# Patient Record
Sex: FEMALE | Race: WHITE | NOT HISPANIC OR LATINO | Employment: OTHER | URBAN - METROPOLITAN AREA
[De-identification: names, ages, dates, MRNs, and addresses within clinical notes are randomized per-mention and may not be internally consistent; named-entity substitution may affect disease eponyms.]

---

## 2017-09-18 ENCOUNTER — HISTORICAL (OUTPATIENT)
Dept: ADMINISTRATIVE | Facility: HOSPITAL | Age: 82
End: 2017-09-18

## 2019-07-09 ENCOUNTER — HISTORICAL (OUTPATIENT)
Dept: ADMINISTRATIVE | Facility: HOSPITAL | Age: 84
End: 2019-07-09

## 2019-07-09 LAB
ABS NEUT (OLG): 2.86 X10(3)/MCL (ref 2.1–9.2)
ALBUMIN SERPL-MCNC: 3.6 GM/DL (ref 3.4–5)
ALBUMIN/GLOB SERPL: 1 RATIO (ref 1.1–2)
ALP SERPL-CCNC: 63 UNIT/L (ref 45–117)
ALT SERPL-CCNC: 16 UNIT/L (ref 12–78)
AST SERPL-CCNC: 14 UNIT/L (ref 15–37)
BASOPHILS # BLD AUTO: 0.05 X10(3)/MCL
BASOPHILS NFR BLD AUTO: 1 %
BILIRUB SERPL-MCNC: 0.6 MG/DL (ref 0.2–1)
BILIRUBIN DIRECT+TOT PNL SERPL-MCNC: 0.1 MG/DL
BILIRUBIN DIRECT+TOT PNL SERPL-MCNC: 0.5 MG/DL
BUN SERPL-MCNC: 10 MG/DL (ref 7–18)
CALCIUM SERPL-MCNC: 9 MG/DL (ref 8.5–10.1)
CHLORIDE SERPL-SCNC: 107 MMOL/L (ref 98–107)
CHOLEST SERPL-MCNC: 247 MG/DL
CHOLEST/HDLC SERPL: 3.4 {RATIO} (ref 0–4.4)
CO2 SERPL-SCNC: 26 MMOL/L (ref 21–32)
CREAT SERPL-MCNC: 0.8 MG/DL (ref 0.6–1.3)
EOSINOPHIL # BLD AUTO: 0.17 10*3/UL
EOSINOPHIL NFR BLD AUTO: 3 %
ERYTHROCYTE [DISTWIDTH] IN BLOOD BY AUTOMATED COUNT: 13.9 % (ref 11.5–14.5)
EST. AVERAGE GLUCOSE BLD GHB EST-MCNC: 117 MG/DL
GLOBULIN SER-MCNC: 3.5 GM/ML (ref 2.3–3.5)
GLUCOSE SERPL-MCNC: 100 MG/DL (ref 74–106)
HBA1C MFR BLD: 5.7 % (ref 4.2–6.3)
HCT VFR BLD AUTO: 46 % (ref 35–46)
HDLC SERPL-MCNC: 72 MG/DL
HGB BLD-MCNC: 14.8 GM/DL (ref 12–16)
IMM GRANULOCYTES # BLD AUTO: 0.02 10*3/UL
IMM GRANULOCYTES NFR BLD AUTO: 0 %
LDLC SERPL CALC-MCNC: 152 MG/DL (ref 0–130)
LYMPHOCYTES # BLD AUTO: 2 X10(3)/MCL
LYMPHOCYTES NFR BLD AUTO: 36 % (ref 13–40)
MCH RBC QN AUTO: 29.9 PG (ref 26–34)
MCHC RBC AUTO-ENTMCNC: 32.2 GM/DL (ref 31–37)
MCV RBC AUTO: 92.9 FL (ref 80–100)
MONOCYTES # BLD AUTO: 0.52 X10(3)/MCL
MONOCYTES NFR BLD AUTO: 9 % (ref 4–12)
NEUTROPHILS # BLD AUTO: 2.86 X10(3)/MCL
NEUTROPHILS NFR BLD AUTO: 51 X10(3)/MCL
PLATELET # BLD AUTO: 239 X10(3)/MCL (ref 130–400)
PMV BLD AUTO: 10.5 FL (ref 7.4–10.4)
POTASSIUM SERPL-SCNC: 4 MMOL/L (ref 3.5–5.1)
PROT SERPL-MCNC: 7.1 GM/DL (ref 6.4–8.2)
RBC # BLD AUTO: 4.95 X10(6)/MCL (ref 4–5.2)
SODIUM SERPL-SCNC: 140 MMOL/L (ref 136–145)
TRIGL SERPL-MCNC: 117 MG/DL
TSH SERPL-ACNC: 2.19 MIU/L (ref 0.36–3.74)
VLDLC SERPL CALC-MCNC: 23 MG/DL
WBC # SPEC AUTO: 5.6 X10(3)/MCL (ref 4.5–11)

## 2020-09-21 ENCOUNTER — HISTORICAL (OUTPATIENT)
Dept: ADMINISTRATIVE | Facility: HOSPITAL | Age: 85
End: 2020-09-21

## 2020-09-21 LAB
ABS NEUT (OLG): 3.77 X10(3)/MCL (ref 2.1–9.2)
ALBUMIN SERPL-MCNC: 3.6 GM/DL (ref 3.4–5)
ALBUMIN/GLOB SERPL: 0.9 RATIO (ref 1.1–2)
ALP SERPL-CCNC: 75 UNIT/L (ref 45–117)
ALT SERPL-CCNC: 18 UNIT/L (ref 12–78)
AST SERPL-CCNC: 13 UNIT/L (ref 15–37)
BASOPHILS # BLD AUTO: 0.1 X10(3)/MCL (ref 0–0.2)
BASOPHILS NFR BLD AUTO: 1 %
BILIRUB SERPL-MCNC: 0.4 MG/DL (ref 0.2–1)
BILIRUBIN DIRECT+TOT PNL SERPL-MCNC: 0.1 MG/DL (ref 0–0.2)
BILIRUBIN DIRECT+TOT PNL SERPL-MCNC: 0.3 MG/DL
BUN SERPL-MCNC: 14 MG/DL (ref 7–18)
CALCIUM SERPL-MCNC: 9 MG/DL (ref 8.5–10.1)
CHLORIDE SERPL-SCNC: 102 MMOL/L (ref 98–107)
CHOLEST SERPL-MCNC: 255 MG/DL
CHOLEST/HDLC SERPL: 3.8 {RATIO} (ref 0–4.4)
CO2 SERPL-SCNC: 31 MMOL/L (ref 21–32)
CREAT SERPL-MCNC: 0.9 MG/DL (ref 0.6–1.3)
EOSINOPHIL # BLD AUTO: 0.2 X10(3)/MCL (ref 0–0.9)
EOSINOPHIL NFR BLD AUTO: 3 %
ERYTHROCYTE [DISTWIDTH] IN BLOOD BY AUTOMATED COUNT: 13.8 % (ref 11.5–14.5)
EST. AVERAGE GLUCOSE BLD GHB EST-MCNC: 128 MG/DL
GLOBULIN SER-MCNC: 4 GM/ML (ref 2.3–3.5)
GLUCOSE SERPL-MCNC: 94 MG/DL (ref 82–115)
HBA1C MFR BLD: 6.1 % (ref 4.2–6.3)
HCT VFR BLD AUTO: 45.2 % (ref 35–46)
HDLC SERPL-MCNC: 67 MG/DL (ref 40–59)
HGB BLD-MCNC: 14.6 GM/DL (ref 12–16)
IMM GRANULOCYTES # BLD AUTO: 0.01 10*3/UL
IMM GRANULOCYTES NFR BLD AUTO: 0 %
LDLC SERPL CALC-MCNC: 146 MG/DL
LYMPHOCYTES # BLD AUTO: 2 X10(3)/MCL (ref 0.6–4.6)
LYMPHOCYTES NFR BLD AUTO: 31 %
MCH RBC QN AUTO: 30.7 PG (ref 26–34)
MCHC RBC AUTO-ENTMCNC: 32.3 GM/DL (ref 31–37)
MCV RBC AUTO: 95 FL (ref 80–100)
MONOCYTES # BLD AUTO: 0.6 X10(3)/MCL (ref 0.1–1.3)
MONOCYTES NFR BLD AUTO: 9 %
NEUTROPHILS # BLD AUTO: 3.77 X10(3)/MCL (ref 2.1–9.2)
NEUTROPHILS NFR BLD AUTO: 56 %
PLATELET # BLD AUTO: 252 X10(3)/MCL (ref 130–400)
PMV BLD AUTO: 10.4 FL (ref 7.4–10.4)
POTASSIUM SERPL-SCNC: 4.2 MMOL/L (ref 3.5–5.1)
PROT SERPL-MCNC: 7.6 GM/DL (ref 6.4–8.2)
RBC # BLD AUTO: 4.76 X10(6)/MCL (ref 4–5.2)
SODIUM SERPL-SCNC: 139 MMOL/L (ref 136–145)
T4 FREE SERPL-MCNC: 1.06 NG/DL (ref 0.76–1.46)
TRIGL SERPL-MCNC: 211 MG/DL
TSH SERPL-ACNC: 2.34 MIU/L (ref 0.36–3.74)
VLDLC SERPL CALC-MCNC: 42 MG/DL
WBC # SPEC AUTO: 6.7 X10(3)/MCL (ref 4.5–11)

## 2021-07-01 ENCOUNTER — HISTORICAL (OUTPATIENT)
Dept: ADMINISTRATIVE | Facility: HOSPITAL | Age: 86
End: 2021-07-01

## 2021-07-01 LAB
ALBUMIN SERPL-MCNC: 3.8 GM/DL (ref 3.4–4.8)
ALBUMIN/GLOB SERPL: 1 RATIO (ref 1.1–2)
ALP SERPL-CCNC: 74 UNIT/L (ref 40–150)
ALT SERPL-CCNC: 9 UNIT/L (ref 0–55)
AST SERPL-CCNC: 15 UNIT/L (ref 5–34)
BILIRUB SERPL-MCNC: 0.7 MG/DL
BILIRUBIN DIRECT+TOT PNL SERPL-MCNC: 0.2 MG/DL (ref 0–0.5)
BILIRUBIN DIRECT+TOT PNL SERPL-MCNC: 0.5 MG/DL (ref 0–0.8)
BUN SERPL-MCNC: 16.2 MG/DL (ref 9.8–20.1)
CALCIUM SERPL-MCNC: 10 MG/DL (ref 8.4–10.2)
CHLORIDE SERPL-SCNC: 102 MMOL/L (ref 98–107)
CO2 SERPL-SCNC: 31 MMOL/L (ref 23–31)
CREAT SERPL-MCNC: 0.84 MG/DL (ref 0.55–1.02)
EST CREAT CLEARANCE SER (OHS): 57.53 ML/MIN
GLOBULIN SER-MCNC: 3.8 GM/DL (ref 2.4–3.5)
GLUCOSE SERPL-MCNC: 109 MG/DL (ref 82–115)
POTASSIUM SERPL-SCNC: 4.2 MMOL/L (ref 3.5–5.1)
PROT SERPL-MCNC: 7.6 GM/DL (ref 5.8–7.6)
SODIUM SERPL-SCNC: 142 MMOL/L (ref 136–145)

## 2022-04-07 ENCOUNTER — HISTORICAL (OUTPATIENT)
Dept: ADMINISTRATIVE | Facility: HOSPITAL | Age: 87
End: 2022-04-07
Payer: MEDICARE

## 2022-04-23 VITALS
DIASTOLIC BLOOD PRESSURE: 76 MMHG | BODY MASS INDEX: 32.05 KG/M2 | WEIGHT: 169.75 LBS | OXYGEN SATURATION: 95 % | HEIGHT: 61 IN | SYSTOLIC BLOOD PRESSURE: 148 MMHG

## 2022-05-01 NOTE — HISTORICAL [facility] CERNER
This is a historical note converted from Prieto. Formatting and pictures may have been removed.  Please reference Prieto for original formatting and attached multimedia. Chief Complaint  check up unsure if she takes vesicare  History of Present Illness  84 yo wf with obesity macular degeneration as sees cards htn chol arthritis  Review of Systems  neg cv, neg pulm, neg gi gu ros as documentd in hpi  Physical Exam  Vitals & Measurements  T:?36.7? ?C (Oral)? HR:?74(Peripheral)? RR:?18? BP:?130/74?  HT:?154.00?cm? WT:?77.400?kg? BMI:?32.64?  aax4 nad  niecy eomi  cv rrr s1s2 no mgr  lungs cta no cr or whz  abd nt soft  ext no edema  neuro intact  Assessment/Plan  1.?HLD (hyperlipidemia)?E78.5  ?flp?? lab today  ?  2.?HTN (hypertension)?I10  ?controlled  Ordered:  CBC w/ Auto Diff, Routine collect, *Est. 09/21/20 3:00:00 CDT, Blood, Order for future visit, *Est. Stop date 09/21/20 3:00:00 CDT, Lab Collect, HTN (hypertension), 09/21/20 11:51:00 CDT  Clinic Follow up, *Est. 03/21/21 3:00:00 CDT, Order for future visit, HTN (hypertension), [facility]  Comprehensive Metabolic Panel, Routine collect, *Est. 09/21/20 3:00:00 CDT, Blood, Order for future visit, *Est. Stop date 09/21/20 3:00:00 CDT, Lab Collect, HTN (hypertension), 09/21/20 11:51:00 CDT  Free T4, Routine collect, *Est. 09/21/20 3:00:00 CDT, Blood, Order for future visit, *Est. Stop date 09/21/20 3:00:00 CDT, Lab Collect, HTN (hypertension), 09/21/20 11:51:00 CDT  Hemoglobin A1C [facility], Routine collect, *Est. 09/21/20 3:00:00 CDT, Blood, Order for future visit, *Est. Stop date 09/21/20 3:00:00 CDT, Lab Collect, HTN (hypertension), 09/21/20 11:52:00 CDT  Lipid Panel, Routine collect, *Est. 09/21/20 3:00:00 CDT, Blood, Order for future visit, *Est. Stop date 09/21/20 3:00:00 CDT, Lab Collect, HTN (hypertension), 09/21/20 11:51:00 CDT  Office/Outpatient Visit Level 4 Established [id number] PC, HTN (hypertension), [facility], 09/21/20 11:51:00 CDT  Thyroid Stimulating  Hormone, Routine collect, *Est. 09/21/20 3:00:00 CDT, Blood, Order for future visit, *Est. Stop date 09/21/20 3:00:00 CDT, Lab Collect, HTN (hypertension), 09/21/20 11:51:00 CDT  ?  3.?Obesity?E66.9  ?dash  ?  4.?SOB (shortness of breath)?R06.02  ?nuc stress test  Ordered:  Nuclear Pharmacological Stress Test, *Est. 09/28/20 3:00:00 CDT, Routine, Angina Pectoris (Chest Pain), *Est. Stop date 09/28/20 3:00:00 CDT, Ambulatory, [facility], Order for future visit, SOB (shortness of breath)  ?  Referrals  Clinic Follow up, *Est. 03/21/21 3:00:00 CDT, Order for future visit, HTN (hypertension), [facility]   Problem List/Past Medical History  Ongoing  AMD (age-related macular degeneration), bilateral  Dermal nevus  HLD (hyperlipidemia)  HTN (hypertension)  Obesity  Osteoporosis  Pseudophakia of both eyes  Historical  No qualifying data  Procedure/Surgical History  bladder surgery (2005)  Abdominoplasty (1974)  Breast reduction, bilateral (1974)  abdominal surgery (1958)  Appendectomy (1943)  Appendectomy  Augmentation of bladder  Breast reduction  Laparoscopy  Oophorectomy  Partial oophorectomy  Phacoemulsification of lens and insertion of intraocular lens   Medications  Boniva 150 mg oral tablet, 150 mg= 1 tab(s), Oral, qMonth, 3 refills  FLUZONE HD INJ PF 18-19  hydrochlorothiazide-lisinopril 12.5 mg-10 mg oral tablet, 1 tab(s), Oral, Daily, 1 refills  olopatadine 0.1% ophthalmic solution, 1 drop(s), Eye-Both, BID, 5 refills  oxybutynin 5 mg oral tablet, 5 mg= 1 tab(s), Oral, TID, 5 refills  simvastatin 40 mg oral tablet, 40 mg= 1 tab(s), Oral, Once a day (at bedtime), 1 refills  tolterodine 4 mg oral capsule, extended release, 4 mg= 1 cap(s), Oral, Daily, 3 refills  VESIcare 10 mg oral tablet, 10 mg= 1 tab(s), Oral, Daily, 11 refills  Vitamin D with Minerals oral tablet  Allergies  codeine  Social History  Abuse/Neglect  No, No, Yes, 09/21/2020  Alcohol  Current, Wine, 1-2 times per month,  Started age 18 Years. Alcohol use interferes with work or home: No. Drinks more than intended: No. Others hurt by drinking: No. Ready to change: No. Household alcohol concerns: No., 05/18/2015  Employment/School  Employed, Highest education level: Some college., 05/18/2015  Exercise - Does not exercise, 05/18/2015  Self assessment: Excellent condition., 08/31/2015  Home/Environment  Lives with Alone. Living situation: Home/Independent. Alcohol abuse in household: No. Substance abuse in household: No. Smoker in household: No. Injuries/Abuse/Neglect in household: No. Feels unsafe at home: No. Safe place to go: Yes. Family/Friends available for support: Yes. Concern for family members at home: No. Major illness in household: No. Financial concerns: No. TV/Computer concerns: No. Risks in environment: Unlocked guns, Pets/Animal exposure., 08/31/2015  Nutrition/Health  Regular, Caffeine intake amount: 1 cup of coffee a week. Wants to lose weight: Yes. Sleeping concerns: No. Feels highly stressed: No., 05/18/2015  Other  Sexual  Gender Identity Identifies as female., 08/21/2020  Substance Use  Never, 12/14/2015  Tobacco  Never (less than 100 in lifetime), N/A, 09/21/2020  Immunizations  Vaccine Date Status Comments   influenza virus vaccine, inactivated 11/25/2019 Recorded    influenza virus vaccine, inactivated 11/16/2018 Recorded    pneumococcal vacc 06/08/2018 Recorded [6/8/2018] Immunization record scanned in   pneumococcal 13-valent conjugate vaccine 06/06/2018 Recorded    influenza virus vaccine, inactivated 12/04/2017 Given Patient tolerated procedure well.   influenza virus vaccine, inactivated 10/27/2016 Given    influenza virus vaccine, inactivated 12/14/2015 Given    pneumococcal vacc 10/23/2012 Recorded    influenza virus vaccine, inactivated 09/20/2011 Recorded    influenza virus vaccine, inactivated 10/20/2009 Recorded    influenza virus vaccine, inactivated 03/07/2005 Recorded    influenza virus vaccine,  inactivated 12/12/2003 Recorded    influenza virus vaccine, inactivated 03/12/2003 Recorded    influenza virus vaccine, inactivated 12/11/2001 Recorded    Health Maintenance  Health Maintenance  ???Pending?(in the next year)  ??? ??OverDue  ??? ? ? ?Influenza Vaccine due??and every?  ??? ? ? ?Hypertension Management-BMP due??07/08/20??and every 1??year(s)  ??? ??Due?  ??? ? ? ?Hypertension Maintenance-Medication Prescribed due??09/21/20??and every 1??year(s)  ??? ? ? ?Hypertension Management-Education due??09/21/20??and every 1??year(s)  ??? ? ? ?Medicare Annual Wellness Exam due??09/21/20??and every 1??year(s)  ??? ? ? ?Pneumococcal Vaccine due??09/21/20??and every?  ??? ? ? ?Tetanus Vaccine due??09/21/20??and every 10??year(s)  ??? ? ? ?Zoster Vaccine due??09/21/20??and every?  ??? ??Due In Future?  ??? ? ? ?Obesity Screening not due until??01/01/21??and every 1??year(s)  ??? ? ? ?Advance Directive not due until??01/02/21??and every 1??year(s)  ??? ? ? ?Cognitive Screening not due until??01/02/21??and every 1??year(s)  ??? ? ? ?Fall Risk Assessment not due until??01/02/21??and every 1??year(s)  ??? ? ? ?Functional Assessment not due until??01/02/21??and every 1??year(s)  ???Satisfied?(in the past 1 year)  ??? ??Satisfied?  ??? ? ? ?ADL Screening on??09/21/20.??Satisfied by Ananda Vu LPN Aerial  ??? ? ? ?Advance Directive on??09/21/20.??Satisfied by Ananda Vu LPN Aerial  ??? ? ? ?Blood Pressure Screening on??09/21/20.??Satisfied by Vu LPN, Ananda Aerial  ??? ? ? ?Body Mass Index Check on??09/21/20.??Satisfied by Vu LPN, Ananda Aerial  ??? ? ? ?Cognitive Screening on??09/21/20.??Satisfied by Vu LPN, Ananda Aerial  ??? ? ? ?Depression Screening on??09/21/20.??Satisfied by Vu LPN, Ananda Aerial  ??? ? ? ?Fall Risk Assessment on??09/21/20.??Satisfied by Vu LPN, Ananda Aerial  ??? ? ? ?Functional Assessment on??09/21/20.??Satisfied by Vu LPN, Ananda Aerial  ??? ? ? ?Hypertension  Management-Blood Pressure on??09/21/20.??Satisfied by Ananda Vu LPN  ??? ? ? ?Influenza Vaccine on??11/25/19.??Satisfied by Jeanie Cho LPN  ??? ? ? ?Obesity Screening on??09/21/20.??Satisfied by Ananda Vu LPN  ?

## 2022-05-02 NOTE — HISTORICAL [facility] CERNER
This is a historical note converted from Cerner. Formatting and pictures may have been removed.  Please reference Prieto for original formatting and attached multimedia. Chief Complaint  New pt, CKD, swelling left leg majority of time-had to take 2 fluid pills  History of Present Illness  This is a very pleasant 86-year-old  female with past medical history of hypertension,?age-related macular degeneration, and osteoporosis. ?Patient presents to establish care in nephrology clinic today.? Complains of?lower extremity edema, states that she?was taking?2 pills of hydrochlorothiazide-lisinopril instead of 1. ?Denies nausea, vomiting, shortness of breath, chest pain.  Review of Systems  12 point review of systems conducted, negative except as stated in the history of present illness.  Physical Exam  Vitals & Measurements  T:?36.8? ?C (Oral)? HR:?95(Peripheral)? RR:?18? BP:?122/76?  HT:?155.50?cm? WT:?75.800?kg? BMI:?31.35?  General appearance: Appears in no acute distress.?  Skin: No rashes, capillary refill <3 sec. Good turgor.?  HEENT: NC/AT. PERRLA, EOMI, no scleral icterus. No thyromegaly, JVD, neck supple.?  Chest: Equal expansion, clear to auscultation bilaterally, respirations unlabored.?  Heart: S1-S2  Abdomen: Benign.?  Genitourinary: Deferred  Extremities: No edema, no cyanosis or clubbing, pulses equal and palpable throughout.?  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 ?Will hold?lisinopril-hydrochlorothiazide, check CMP today, follow-up with results by phone.  Continue renal sparing activities:  ?   -Follow low sodium diet (2 grams a day)  -Control high blood pressure (?goal BP < 130/80, please record BP at home every day and bring log to next office visit)  -Exercise at least 30 minutes a day, 5 days a week.  -Maintain healthy weight.  -Stay well hydrated  -Receive Pneumovax, Flu, and HBV vaccines if  indicated.  -Do not take NSAIDs (Ibuprofen, Naproxen, Aleve, Advil, Toradol, Mobic), may take only Tylenol as needed for pain/headaches.  -Take cholesterol-lowering medications as prescribed (LDL goal <100)  ?   Handout on treatment options for kidney disease provided.  F/U with PCP as scheduled  [facility] with routine labs in?4 months  Ordered:  1160F- Medication reconciliation completed during visit, CKD (chronic kidney disease), stage III  HTN (hypertension)  BMI 32.0-32.9,adult, [facility], 07/01/21 12:09:00 CDT  1160F- Medication reconciliation completed during visit, CKD (chronic kidney disease), stage III  HTN (hypertension)  BMI 32.0-32.9,adult, [facility], 07/01/21 12:07:00 CDT  Office/Outpatient Visit Level 4 Established [id number] PC, CKD (chronic kidney disease), stage III  HTN (hypertension)  BMI 32.0-32.9,adult, [facility], 07/01/21 12:07:00 CDT  Office/Outpatient Visit Level 4 New [id number] PC, CKD (chronic kidney disease), stage III  HTN (hypertension)  BMI 32.0-32.9,adult, [facility], 07/01/21 12:09:00 CDT  ?  2.?HTN (hypertension)?I10  Bp is at goal. Will hold HCTZ-lisinopril and continue to monitor BP. Will start torsemide for edema.  Ordered:  1160F- Medication reconciliation completed during visit, CKD (chronic kidney disease), stage III  HTN (hypertension)  BMI 32.0-32.9,adult, [facility], 07/01/21 12:09:00 CDT  1160F- Medication reconciliation completed during visit, CKD (chronic kidney disease), stage III  HTN (hypertension)  BMI 32.0-32.9,adult, [facility], 07/01/21 12:07:00 CDT  Office/Outpatient Visit Level 4 Established [id number] PC, CKD (chronic kidney disease), stage III  HTN (hypertension)  BMI 32.0-32.9,adult, [facility], 07/01/21 12:07:00 CDT  Office/Outpatient Visit Level 4 New [id number] PC, CKD (chronic kidney disease), stage III  HTN (hypertension)  BMI 32.0-32.9,adult, [facility], 07/01/21 12:09:00 CDT  ?  3.?BMI  32.0-32.9,adult?Z68.32  ?Lifestyle/dietary interventions discussed: counseled on weight loss using portion control, non-sedentary lifestyle, low-carb, low-fat, 1800-calorie ADA diet.?  Ordered:  1160F- Medication reconciliation completed during visit, CKD (chronic kidney disease), stage III  HTN (hypertension)  BMI 32.0-32.9,adult, [facility], 07/01/21 12:09:00 CDT  1160F- Medication reconciliation completed during visit, CKD (chronic kidney disease), stage III  HTN (hypertension)  BMI 32.0-32.9,adult, [facility], 07/01/21 12:07:00 CDT  Office/Outpatient Visit Level 4 Established [id number] PC, CKD (chronic kidney disease), stage III  HTN (hypertension)  BMI 32.0-32.9,adult, [facility], 07/01/21 12:07:00 CDT  Office/Outpatient Visit Level 4 New [id number] PC, CKD (chronic kidney disease), stage III  HTN (hypertension)  BMI 32.0-32.9,adult, [facility], 07/01/21 12:09:00 CDT  ?   Problem List/Past Medical History  Ongoing  AMD (age-related macular degeneration), bilateral  Dermal nevus  HLD (hyperlipidemia)  HTN (hypertension)  Obesity  Osteoporosis  Pseudophakia of both eyes  Historical  No qualifying data  Procedure/Surgical History  bladder surgery (2005)  Abdominoplasty (1974)  Breast reduction, bilateral (1974)  abdominal surgery (1958)  Appendectomy (1943)  Appendectomy  Augmentation of bladder  Breast reduction  Laparoscopy  Oophorectomy  Partial oophorectomy  Phacoemulsification of lens and insertion of intraocular lens   Medications  Boniva 150 mg oral tablet, 150 mg= 1 tab(s), Oral, qMonth, 3 refills  FLUZONE HD INJ PF 18-19  Natures Bounty Hair Skin & Nails, Chewed, Daily  oxybutynin 5 mg oral tablet, 5 mg= 1 tab(s), Oral, TID, 3 refills  simvastatin 80 mg oral tablet, 80 mg= 1 tab(s), Oral, Once a day (at bedtime), 3 refills  solifenacin 10 mg oral tablet, See Instructions,? ?Not taking: duplicate  tolterodine 4 mg oral capsule, extended release, 4 mg= 1 cap(s), Oral, Daily, 3  refills  torsemide 10 mg oral tablet, 10 mg= 1 tab(s), Oral, Daily, 1 refills  VESIcare 10 mg oral tablet, 10 mg= 1 tab(s), Oral, Daily, 11 refills  Vitamin D with Minerals oral tablet  Allergies  codeine  Social History  Abuse/Neglect  No, 06/18/2021  Alcohol  Current, Wine, 1-2 times per month, Started age 18 Years. Alcohol use interferes with work or home: No. Drinks more than intended: No. Others hurt by drinking: No. Ready to change: No. Household alcohol concerns: No., 05/18/2015  Employment/School  Employed, Highest education level: Some college., 05/18/2015  Exercise - Does not exercise, 05/18/2015  Self assessment: Excellent condition., 08/31/2015  Home/Environment  Lives with Alone. Living situation: Home/Independent. Alcohol abuse in household: No. Substance abuse in household: No. Smoker in household: No. Injuries/Abuse/Neglect in household: No. Feels unsafe at home: No. Safe place to go: Yes. Family/Friends available for support: Yes. Concern for family members at home: No. Major illness in household: No. Financial concerns: No. TV/Computer concerns: No. Risks in environment: Unlocked guns, Pets/Animal exposure., 08/31/2015  Nutrition/Health  Regular, Caffeine intake amount: 1 cup of coffee a week. Wants to lose weight: Yes. Sleeping concerns: No. Feels highly stressed: No., 05/18/2015  Other  Sexual  Gender Identity Identifies as female., 12/08/2020  Substance Use  Never, 12/14/2015  Tobacco  Never (less than 100 in lifetime), N/A, 06/18/2021  Immunizations  Vaccine Date Status Comments   COVID-19 MRNA, LNP-S, PF- Pfizer 02/10/2021 Given    COVID-19 MRNA, LNP-S, PF- Pfizer 01/20/2021 Given ADMINISTERED BY ANDRES MERCER LPN.   zoster vaccine, inactivated 01/31/2020 Recorded    influenza virus vaccine, inactivated 11/25/2019 Recorded    influenza virus vaccine, inactivated 11/16/2018 Recorded    pneumococcal vacc 06/08/2018 Recorded [6/8/2018] Immunization record scanned in   pneumococcal 13-valent  conjugate vaccine 06/06/2018 Recorded    influenza virus vaccine, inactivated 12/04/2017 Given Patient tolerated procedure well.   influenza virus vaccine, inactivated 10/27/2016 Given    influenza virus vaccine, inactivated 12/14/2015 Given    pneumococcal vacc 10/23/2012 Recorded    influenza virus vaccine, inactivated 09/20/2011 Recorded    influenza virus vaccine, inactivated 10/20/2009 Recorded    influenza virus vaccine, inactivated 03/07/2005 Recorded    influenza virus vaccine, inactivated 12/12/2003 Recorded    influenza virus vaccine, inactivated 03/12/2003 Recorded    influenza virus vaccine, inactivated 12/11/2001 Recorded    Health Maintenance  Health Maintenance  ???Pending?(in the next year)  ??? ??OverDue  ??? ? ? ?Influenza Vaccine due??10/01/20??and every 1??day(s)  ??? ? ? ?Advance Directive due??01/02/21??and every 1??year(s)  ??? ? ? ?Cognitive Screening due??01/02/21??and every 1??year(s)  ??? ? ? ?Functional Assessment due??01/02/21??and every 1??year(s)  ??? ??Due?  ??? ? ? ?Hypertension Maintenance-Medication Prescribed due??07/01/21??and every 1??year(s)  ??? ? ? ?Hypertension Management-Education due??07/01/21??and every 1??year(s)  ??? ? ? ?Medicare Annual Wellness Exam due??07/01/21??and every 1??year(s)  ??? ? ? ?Pneumococcal Vaccine due??07/01/21??Unknown Frequency  ??? ? ? ?Tetanus Vaccine due??07/01/21??and every 10??year(s)  ??? ? ? ?Zoster Vaccine due??07/01/21??Unknown Frequency  ??? ??Due In Future?  ??? ? ? ?Blood Pressure Screening not due until??09/21/21??and every 1??year(s)  ??? ? ? ?Depression Screening not due until??09/21/21??and every 1??year(s)  ??? ? ? ?Hypertension Management-Blood Pressure not due until??09/21/21??and every 1??year(s)  ??? ? ? ?ADL Screening not due until??09/21/21??and every 1??year(s)  ??? ? ? ?Hypertension Management-BMP not due until??10/28/21??and every 1??year(s)  ??? ? ? ?Obesity Screening not due until??01/01/22??and every 1??year(s)  ??? ? ?  ?Fall Risk Assessment not due until??01/02/22??and every 1??year(s)  ???Satisfied?(in the past 1 year)  ??? ??Satisfied?  ??? ? ? ?ADL Screening on??09/21/20.??Satisfied by Vu LPRERE, Ananda Aerial  ??? ? ? ?Advance Directive on??09/21/20.??Satisfied by Vu LPRERE, Ananda Aerial  ??? ? ? ?Blood Pressure Screening on??09/21/20.??Satisfied by Vu LPRERE, Ananda Aerial  ??? ? ? ?Body Mass Index Check on??06/18/21.??Satisfied by Rafia Valenzuela  ??? ? ? ?Cognitive Screening on??09/21/20.??Satisfied by Vu LPRERE, Ananda Aerial  ??? ? ? ?Depression Screening on??09/21/20.??Satisfied by Vu LPRERE, Sportube  ??? ? ? ?Diabetes Screening on??10/28/20.??Satisfied by James Bertrand  ??? ? ? ?Fall Risk Assessment on??07/01/21.??Satisfied by Adelaida Pavon  ??? ? ? ?Functional Assessment on??09/21/20.??Satisfied by Vu LPRERE, Ananda Aerial  ??? ? ? ?Hypertension Management-BMP on??10/28/20.??Satisfied by James Bertrand  ??? ? ? ?Lipid Screening on??10/28/20.??Satisfied by James Bertrand  ??? ? ? ?Obesity Screening on??06/18/21.??Satisfied by Rafia Valenzuela  ?  Lab Results  Test Name Test Result Date/Time   Sodium Lvl 138 mmol/L 10/28/2020 08:58 CDT   Potassium Lvl 4.6 mmol/L 10/28/2020 08:58 CDT   Chloride 99 mmol/L 10/28/2020 08:58 CDT   CO2 32 mmol/L (High) 10/28/2020 08:58 CDT   Calcium Lvl 9.3 mg/dL 10/28/2020 08:58 CDT   Glucose Lvl 102 mg/dL 10/28/2020 08:58 CDT   .4 mg/dL 10/28/2020 08:58 CDT   BUN 20.0 mg/dL 10/28/2020 08:58 CDT   Creatinine 1.17 mg/dL (High) 10/28/2020 08:58 CDT   eGFR-TANVIR 47 10/28/2020 08:58 CDT   Bili Total 0.5 mg/dL 10/28/2020 08:58 CDT   Bili Direct 0.2 mg/dL 10/28/2020 08:58 CDT   Bili Indirect 0.30 mg/dL 10/28/2020 08:58 CDT   AST 17 unit/L 10/28/2020 08:58 CDT   ALT 13 unit/L 10/28/2020 08:58 CDT   Alk Phos 81 unit/L 10/28/2020 08:58 CDT   Total Protein 7.5 gm/dL 10/28/2020 08:58 CDT   Albumin Lvl 3.8 gm/dL 10/28/2020 08:58 CDT   Globulin 3.7  gm/dL (High) 10/28/2020 08:58 CDT   A/G Ratio 1.0 ratio (Low) 10/28/2020 08:58 CDT   Hgb A1c 5.3 % 10/28/2020 08:58 CDT   Chol 255 mg/dL (High) 10/28/2020 08:58 CDT   HDL 59 mg/dL 10/28/2020 08:58 CDT   Trig 149 mg/dL (High) 10/28/2020 08:58 CDT   .00 mg/dL (High) 10/28/2020 08:58 CDT   TSH 2.8829 uIU/mL 10/28/2020 08:58 CDT   WBC 6.9 x10(3)/mcL 10/28/2020 08:58 CDT   RBC 4.80 x10(6)/mcL 10/28/2020 08:58 CDT   Hgb 14.4 gm/dL 10/28/2020 08:58 CDT   Hct 45.1 % 10/28/2020 08:58 CDT   Platelet 282 x10(3)/mcL 10/28/2020 08:58 CDT   Diagnostic Results  Reviewed

## 2022-05-23 ENCOUNTER — OFFICE VISIT (OUTPATIENT)
Dept: INTERNAL MEDICINE | Facility: CLINIC | Age: 87
End: 2022-05-23
Payer: MEDICARE

## 2022-05-23 VITALS
TEMPERATURE: 98 F | WEIGHT: 172.19 LBS | HEART RATE: 103 BPM | BODY MASS INDEX: 32.51 KG/M2 | SYSTOLIC BLOOD PRESSURE: 113 MMHG | DIASTOLIC BLOOD PRESSURE: 74 MMHG | HEIGHT: 61 IN

## 2022-05-23 DIAGNOSIS — M81.0 OSTEOPOROSIS, UNSPECIFIED OSTEOPOROSIS TYPE, UNSPECIFIED PATHOLOGICAL FRACTURE PRESENCE: ICD-10-CM

## 2022-05-23 DIAGNOSIS — E78.5 HYPERLIPIDEMIA, UNSPECIFIED HYPERLIPIDEMIA TYPE: ICD-10-CM

## 2022-05-23 DIAGNOSIS — E55.9 VITAMIN D DEFICIENCY: Primary | ICD-10-CM

## 2022-05-23 DIAGNOSIS — I10 HYPERTENSION, UNSPECIFIED TYPE: ICD-10-CM

## 2022-05-23 DIAGNOSIS — I35.0 AORTIC VALVE STENOSIS, ETIOLOGY OF CARDIAC VALVE DISEASE UNSPECIFIED: ICD-10-CM

## 2022-05-23 DIAGNOSIS — N18.9 CHRONIC KIDNEY DISEASE, UNSPECIFIED CKD STAGE: ICD-10-CM

## 2022-05-23 PROBLEM — E66.9 OBESITY: Status: ACTIVE | Noted: 2022-05-23

## 2022-05-23 PROBLEM — H35.30 AGE-RELATED MACULAR DEGENERATION: Status: ACTIVE | Noted: 2022-05-23

## 2022-05-23 PROCEDURE — 99214 PR OFFICE/OUTPT VISIT, EST, LEVL IV, 30-39 MIN: ICD-10-PCS | Mod: S$PBB,,, | Performed by: NURSE PRACTITIONER

## 2022-05-23 PROCEDURE — 3288F PR FALLS RISK ASSESSMENT DOCUMENTED: ICD-10-PCS | Mod: CPTII,,, | Performed by: NURSE PRACTITIONER

## 2022-05-23 PROCEDURE — 99213 OFFICE O/P EST LOW 20 MIN: CPT | Mod: PBBFAC | Performed by: NURSE PRACTITIONER

## 2022-05-23 PROCEDURE — 1101F PT FALLS ASSESS-DOCD LE1/YR: CPT | Mod: CPTII,,, | Performed by: NURSE PRACTITIONER

## 2022-05-23 PROCEDURE — 1160F PR REVIEW ALL MEDS BY PRESCRIBER/CLIN PHARMACIST DOCUMENTED: ICD-10-PCS | Mod: CPTII,,, | Performed by: NURSE PRACTITIONER

## 2022-05-23 PROCEDURE — 3288F FALL RISK ASSESSMENT DOCD: CPT | Mod: CPTII,,, | Performed by: NURSE PRACTITIONER

## 2022-05-23 PROCEDURE — 1159F MED LIST DOCD IN RCRD: CPT | Mod: CPTII,,, | Performed by: NURSE PRACTITIONER

## 2022-05-23 PROCEDURE — 1159F PR MEDICATION LIST DOCUMENTED IN MEDICAL RECORD: ICD-10-PCS | Mod: CPTII,,, | Performed by: NURSE PRACTITIONER

## 2022-05-23 PROCEDURE — 99214 OFFICE O/P EST MOD 30 MIN: CPT | Mod: S$PBB,,, | Performed by: NURSE PRACTITIONER

## 2022-05-23 PROCEDURE — 1101F PR PT FALLS ASSESS DOC 0-1 FALLS W/OUT INJ PAST YR: ICD-10-PCS | Mod: CPTII,,, | Performed by: NURSE PRACTITIONER

## 2022-05-23 PROCEDURE — 1160F RVW MEDS BY RX/DR IN RCRD: CPT | Mod: CPTII,,, | Performed by: NURSE PRACTITIONER

## 2022-05-23 RX ORDER — OLOPATADINE HYDROCHLORIDE 1 MG/ML
SOLUTION/ DROPS OPHTHALMIC
COMMUNITY
Start: 2022-01-31

## 2022-05-23 RX ORDER — IBANDRONATE SODIUM 150 MG/1
150 TABLET, FILM COATED ORAL
COMMUNITY
Start: 2022-02-01 | End: 2022-06-22 | Stop reason: SDUPTHER

## 2022-05-23 RX ORDER — TOLTERODINE 4 MG/1
4 CAPSULE, EXTENDED RELEASE ORAL DAILY
COMMUNITY
Start: 2022-01-29 | End: 2022-06-22 | Stop reason: SDUPTHER

## 2022-05-23 NOTE — PROGRESS NOTES
Internal Medicine Clinic  MICAELA Merritt     Patient Name: Jennifer Hudson   : 1935  MRN:[id number]     CC:  Chief Complaint   Patient presents with    [facility] [place] [facility]        HPI  86-year-old  female, presents to [facility] to establish PCP. Last PCP Dr. Annemarie Flores. Past medical history of hypertension, HLD, Aortic Stenosis, CKD, urinary incontinence, age-related macular degeneration, and osteoporosis. Followed by [facility], and Renal clinic. Renal clinic started her on torsemide 10 mg daily for lower extremity edema, and held hctz/lisinopril. Appears she was lost to Cardiology follow up. Dr. Flores sent a referral to Cardio in 2021 but pt has not been scheduled. Denies chest pain, shortness of breath, cough, fever, headache, dizziness, weakness, abdominal pain, nausea, vomiting, diarrhea, constipation, dysuria, depression, anxiety.              ROS  Review of Systems   Constitutional: Negative.    HENT: Negative.    Eyes: Negative.    Respiratory: Negative.    Cardiovascular: Negative.    Gastrointestinal: Negative.    Endocrine: Negative.    Genitourinary: Negative.    Musculoskeletal: Negative.    Integumentary:  Negative.   Allergic/Immunologic: Negative.    Neurological: Negative.    Hematological: Negative.    Psychiatric/Behavioral: Negative.    All other systems reviewed and are negative.       Physical Examination:  Vitals:    22 1235   BP: 113/74   Pulse: 103   Temp: 98.2 °F (36.8 °C)          Physical Exam  Vitals and nursing note reviewed.   Constitutional:       Appearance: Normal appearance.   HENT:      Head: Normocephalic and atraumatic.      Right Ear: Tympanic membrane, ear canal and external ear normal.      Left Ear: Tympanic membrane, ear canal and external ear normal.      Nose: Nose normal.      Mouth/Throat:      Mouth: Mucous membranes are moist.      Pharynx: Oropharynx is clear.   Eyes:      Extraocular Movements: Extraocular movements intact.      Conjunctiva/sclera:  Conjunctivae normal.      Pupils: Pupils are equal, round, and reactive to light.   Cardiovascular:      Rate and Rhythm: Normal rate and regular rhythm.   Pulmonary:      Effort: Pulmonary effort is normal.      Breath sounds: Normal breath sounds.   Abdominal:      General: Abdomen is flat. Bowel sounds are normal.      Palpations: Abdomen is soft.   Musculoskeletal:         General: Normal range of motion.      Cervical back: Normal range of motion and neck supple.   Skin:     General: Skin is warm and dry.      Capillary Refill: Capillary refill takes less than 2 seconds.   Neurological:      General: No focal deficit present.      Mental Status: She is alert and oriented to person, place, and time. Mental status is at baseline.   Psychiatric:         Mood and Affect: Mood normal.         Behavior: Behavior normal.         Thought Content: Thought content normal.         Judgment: Judgment normal.            Labs/Imaging:  Reviewed    Assessment/Plan:  1. Hypertension, unspecified type  - CBC Auto Differential; Future  - Comprehensive Metabolic Panel; Future  - TSH; Future  - Lipid Panel; Future  - Urinalysis, Reflex to Urine Culture Urine, Clean Catch; Future  Bp stable.   DASH diet: Eat more fruits, vegetables, and low fat dairy foods.  (Less than 2 grams of sodium per day).  Maintain healthy weight with goal BMI <30.   Exercise 30 minutes per day 5 days per week.  Home medications refilled and continued.   Home BP monitoring encouraged with BP parameters given.       2. Hyperlipidemia, unspecified hyperlipidemia type  - CBC Auto Differential; Future  - Comprehensive Metabolic Panel; Future  - Lipid Panel; Future    Cont RX daily; refills given. Take Omega 3 daily.   Stressed importance of dietary modifications. Follow a low cholesterol, low saturated fat diet with less that 200mg of cholesterol a day.  Avoid fried foods and high saturated fats (high saturated fats less than 7% of calories).  Add Flax  Seed/Fish Oil supplements to diet. Increase dietary fiber.  Regular exercise can reduce LDL and raise HDL. Stressed importance of physical activity 5 times per week for 30 minutes per day.     3. Aortic valve stenosis, etiology of cardiac valve disease unspecified  Pending Cardio apt.    4. Osteoporosis, unspecified osteoporosis type, unspecified pathological fracture presence    Boniva continued, fall precautions.    5. Chronic kidney disease, unspecified CKD stage  - CBC Auto Differential; Future  - Comprehensive Metabolic Panel; Future  Renoprotective measures discussed:    -Comply with renal diet (reduce intake of milk and cheese, dried beans, peas, wander, nuts and peanut butter) and low sodium diet (2 grams a day)    -Control diabetes (goal A1C <7.0%)    -Control high blood pressure ( goal BP < 130/80, please record BP at home every day and bring log to next office visit)    -Exercise at least 30 minutes a day, 5 days a week.    -Maintain healthy weight.    -Decrease or stop alcohol use    -Do not smoke    -Stay well hydrated    -Receive Pneumovax, Flu, and HBV vaccines if indicated.    -Do not take NSAIDs (Ibuprofen, Naproxen, Aleve, Advil, Toradol, Mobic), may take only Tylenol as needed for pain/headaches.    -Take cholesterol-lowering medications as prescribed (LDL goal <100)    6. BMI 32.0-32.9,adult  Goal BMI <30.  Exercise 5 times a week for 30 minutes per day.  Avoid soda, simple sugars, excessive rice, potatoes or bread. Limit fast foods and fried foods.  Choose complex carbs in moderation (example: green vegetables, beans, oatmeal). Eat plenty of fresh fruits and vegetables with lean meats daily.  Do not skip meals. Eat a balanced portion size.  Avoid fad diets. Consider permanent healthy life style changes.     7. Vitamin D deficiency  - Vitamin D; Future       Medication List with Changes/Refills   Current Medications    IBANDRONATE (BONIVA) 150 MG TABLET    Take 150 mg by mouth.     MULTIVIT-MIN/IRON/FOLIC/CIL125 (HAIR, SKIN AND NAILS ADVANCED ORAL)    Take by mouth.    OLOPATADINE (PATANOL) 0.1 % OPHTHALMIC SOLUTION      See Instructions, INSTILL 1 DROP INTO EACH EYE TWICE DAILY FOR 30 DAYS, # 10 mL, 0 Refill(s), Pharmacy: [id number], 155, cm, Height/Length Dosing, 12/17/21 10:02:00 CST, 77, kg, Weight Dosing, 12/17/21 10:02:00 CST    TOLTERODINE (DETROL LA) 4 MG 24 HR CAPSULE    Take 4 mg by mouth once daily.    VIT C/E/CUPERIC/ZINC/LUTEIN (PRESERVISION LUTEIN ORAL)    Take by mouth.      Future Appointments   Date Time Provider Department Center   6/17/2022  8:30 AM [facility]    6/20/2022 12:30 PM MICAELA Merritt [facility] [place]   11/16/2022  1:30 PM MICAELA Weaver [facility] [place]        Labs thoroughly reviewed with patient. Medication refills addressed today.  RTC prn and 1 months, with labs 1 week prior to the apt.  COVID 19 precautions given to patient.  Patient voices understanding of all discharge instructions.

## 2022-06-21 ENCOUNTER — LAB VISIT (OUTPATIENT)
Dept: LAB | Facility: HOSPITAL | Age: 87
End: 2022-06-21
Attending: NURSE PRACTITIONER
Payer: MEDICARE

## 2022-06-21 DIAGNOSIS — E78.5 HYPERLIPIDEMIA, UNSPECIFIED HYPERLIPIDEMIA TYPE: ICD-10-CM

## 2022-06-21 DIAGNOSIS — N18.9 CHRONIC KIDNEY DISEASE, UNSPECIFIED CKD STAGE: ICD-10-CM

## 2022-06-21 DIAGNOSIS — I10 HYPERTENSION, UNSPECIFIED TYPE: ICD-10-CM

## 2022-06-21 DIAGNOSIS — E55.9 VITAMIN D DEFICIENCY: ICD-10-CM

## 2022-06-21 LAB
ALBUMIN SERPL-MCNC: 3.8 GM/DL (ref 3.4–4.8)
ALBUMIN/GLOB SERPL: 1.1 RATIO (ref 1.1–2)
ALP SERPL-CCNC: 91 UNIT/L (ref 40–150)
ALT SERPL-CCNC: 14 UNIT/L (ref 0–55)
APPEARANCE UR: CLEAR
AST SERPL-CCNC: 18 UNIT/L (ref 5–34)
BACTERIA #/AREA URNS AUTO: ABNORMAL /HPF
BASOPHILS # BLD AUTO: 0.06 X10(3)/MCL (ref 0–0.2)
BASOPHILS NFR BLD AUTO: 0.9 %
BILIRUB UR QL STRIP.AUTO: NEGATIVE MG/DL
BILIRUBIN DIRECT+TOT PNL SERPL-MCNC: 0.7 MG/DL
BUN SERPL-MCNC: 13.3 MG/DL (ref 9.8–20.1)
CALCIUM SERPL-MCNC: 9.5 MG/DL (ref 8.4–10.2)
CHLORIDE SERPL-SCNC: 105 MMOL/L (ref 98–107)
CHOLEST SERPL-MCNC: 193 MG/DL
CHOLEST/HDLC SERPL: 4 {RATIO} (ref 0–5)
CO2 SERPL-SCNC: 28 MMOL/L (ref 23–31)
COLOR UR AUTO: YELLOW
CREAT SERPL-MCNC: 0.88 MG/DL (ref 0.55–1.02)
DEPRECATED CALCIDIOL+CALCIFEROL SERPL-MC: 36.1 NG/ML (ref 30–80)
EOSINOPHIL # BLD AUTO: 0.17 X10(3)/MCL (ref 0–0.9)
EOSINOPHIL NFR BLD AUTO: 2.7 %
ERYTHROCYTE [DISTWIDTH] IN BLOOD BY AUTOMATED COUNT: 14.3 % (ref 11.5–17)
GLOBULIN SER-MCNC: 3.5 GM/DL (ref 2.4–3.5)
GLUCOSE SERPL-MCNC: 106 MG/DL (ref 82–115)
GLUCOSE UR QL STRIP.AUTO: NORMAL MG/DL
HCT VFR BLD AUTO: 45.6 % (ref 37–47)
HDLC SERPL-MCNC: 54 MG/DL (ref 35–60)
HGB BLD-MCNC: 14 GM/DL (ref 12–16)
HYALINE CASTS #/AREA URNS LPF: ABNORMAL /LPF
IMM GRANULOCYTES # BLD AUTO: 0.01 X10(3)/MCL (ref 0–0.02)
IMM GRANULOCYTES NFR BLD AUTO: 0.2 % (ref 0–0.43)
KETONES UR QL STRIP.AUTO: NEGATIVE MG/DL
LDLC SERPL CALC-MCNC: 115 MG/DL (ref 50–140)
LEUKOCYTE ESTERASE UR QL STRIP.AUTO: 25 UNIT/L
LYMPHOCYTES # BLD AUTO: 1.92 X10(3)/MCL (ref 0.6–4.6)
LYMPHOCYTES NFR BLD AUTO: 30 %
MCH RBC QN AUTO: 28.5 PG (ref 27–31)
MCHC RBC AUTO-ENTMCNC: 30.7 MG/DL (ref 33–36)
MCV RBC AUTO: 92.9 FL (ref 80–94)
MONOCYTES # BLD AUTO: 0.58 X10(3)/MCL (ref 0.1–1.3)
MONOCYTES NFR BLD AUTO: 9.1 %
MUCOUS THREADS URNS QL MICRO: ABNORMAL /LPF
NEUTROPHILS # BLD AUTO: 3.7 X10(3)/MCL (ref 2.1–9.2)
NEUTROPHILS NFR BLD AUTO: 57.1 %
NITRITE UR QL STRIP.AUTO: NEGATIVE
NRBC BLD AUTO-RTO: 0 %
PH UR STRIP.AUTO: 5.5 [PH]
PLATELET # BLD AUTO: 252 X10(3)/MCL (ref 130–400)
PMV BLD AUTO: 10.6 FL (ref 9.4–12.4)
POTASSIUM SERPL-SCNC: 4.9 MMOL/L (ref 3.5–5.1)
PROT SERPL-MCNC: 7.3 GM/DL (ref 5.8–7.6)
PROT UR QL STRIP.AUTO: ABNORMAL MG/DL
RBC # BLD AUTO: 4.91 X10(6)/MCL (ref 4.2–5.4)
RBC #/AREA URNS AUTO: ABNORMAL /HPF
RBC UR QL AUTO: NEGATIVE UNIT/L
SODIUM SERPL-SCNC: 143 MMOL/L (ref 136–145)
SP GR UR STRIP.AUTO: 1.03
SQUAMOUS #/AREA URNS LPF: ABNORMAL /HPF
TRIGL SERPL-MCNC: 118 MG/DL (ref 37–140)
TSH SERPL-ACNC: 2.44 UIU/ML (ref 0.35–4.94)
UROBILINOGEN UR STRIP-ACNC: NORMAL MG/DL
VLDLC SERPL CALC-MCNC: 24 MG/DL
WBC # SPEC AUTO: 6.4 X10(3)/MCL (ref 4.5–11.5)
WBC #/AREA URNS AUTO: ABNORMAL /HPF

## 2022-06-21 PROCEDURE — 84443 ASSAY THYROID STIM HORMONE: CPT

## 2022-06-21 PROCEDURE — 81001 URINALYSIS AUTO W/SCOPE: CPT

## 2022-06-21 PROCEDURE — 80061 LIPID PANEL: CPT

## 2022-06-21 PROCEDURE — 85025 COMPLETE CBC W/AUTO DIFF WBC: CPT

## 2022-06-21 PROCEDURE — 36415 COLL VENOUS BLD VENIPUNCTURE: CPT

## 2022-06-21 PROCEDURE — 82306 VITAMIN D 25 HYDROXY: CPT

## 2022-06-21 PROCEDURE — 80053 COMPREHEN METABOLIC PANEL: CPT

## 2022-06-22 ENCOUNTER — OFFICE VISIT (OUTPATIENT)
Dept: INTERNAL MEDICINE | Facility: CLINIC | Age: 87
End: 2022-06-22
Payer: MEDICARE

## 2022-06-22 DIAGNOSIS — E66.9 OBESITY, UNSPECIFIED CLASSIFICATION, UNSPECIFIED OBESITY TYPE, UNSPECIFIED WHETHER SERIOUS COMORBIDITY PRESENT: ICD-10-CM

## 2022-06-22 DIAGNOSIS — I10 HYPERTENSION, UNSPECIFIED TYPE: ICD-10-CM

## 2022-06-22 DIAGNOSIS — E55.9 VITAMIN D DEFICIENCY: Primary | ICD-10-CM

## 2022-06-22 DIAGNOSIS — E78.5 HYPERLIPIDEMIA, UNSPECIFIED HYPERLIPIDEMIA TYPE: ICD-10-CM

## 2022-06-22 DIAGNOSIS — M81.0 OSTEOPOROSIS, UNSPECIFIED OSTEOPOROSIS TYPE, UNSPECIFIED PATHOLOGICAL FRACTURE PRESENCE: ICD-10-CM

## 2022-06-22 PROCEDURE — 1159F MED LIST DOCD IN RCRD: CPT | Mod: CPTII,95,, | Performed by: NURSE PRACTITIONER

## 2022-06-22 PROCEDURE — 3288F FALL RISK ASSESSMENT DOCD: CPT | Mod: CPTII,95,, | Performed by: NURSE PRACTITIONER

## 2022-06-22 PROCEDURE — 1159F PR MEDICATION LIST DOCUMENTED IN MEDICAL RECORD: ICD-10-PCS | Mod: CPTII,95,, | Performed by: NURSE PRACTITIONER

## 2022-06-22 PROCEDURE — 99443 PR PHYSICIAN TELEPHONE EVALUATION 21-30 MIN: ICD-10-PCS | Mod: 95,,, | Performed by: NURSE PRACTITIONER

## 2022-06-22 PROCEDURE — 99443 PR PHYSICIAN TELEPHONE EVALUATION 21-30 MIN: CPT | Mod: 95,,, | Performed by: NURSE PRACTITIONER

## 2022-06-22 PROCEDURE — 1101F PR PT FALLS ASSESS DOC 0-1 FALLS W/OUT INJ PAST YR: ICD-10-PCS | Mod: CPTII,95,, | Performed by: NURSE PRACTITIONER

## 2022-06-22 PROCEDURE — 1160F RVW MEDS BY RX/DR IN RCRD: CPT | Mod: CPTII,95,, | Performed by: NURSE PRACTITIONER

## 2022-06-22 PROCEDURE — 3288F PR FALLS RISK ASSESSMENT DOCUMENTED: ICD-10-PCS | Mod: CPTII,95,, | Performed by: NURSE PRACTITIONER

## 2022-06-22 PROCEDURE — 1160F PR REVIEW ALL MEDS BY PRESCRIBER/CLIN PHARMACIST DOCUMENTED: ICD-10-PCS | Mod: CPTII,95,, | Performed by: NURSE PRACTITIONER

## 2022-06-22 PROCEDURE — 1101F PT FALLS ASSESS-DOCD LE1/YR: CPT | Mod: CPTII,95,, | Performed by: NURSE PRACTITIONER

## 2022-06-22 RX ORDER — TOLTERODINE 4 MG/1
4 CAPSULE, EXTENDED RELEASE ORAL DAILY
Qty: 90 CAPSULE | Refills: 1 | Status: SHIPPED | OUTPATIENT
Start: 2022-06-22 | End: 2022-10-03 | Stop reason: SDUPTHER

## 2022-06-22 RX ORDER — IBANDRONATE SODIUM 150 MG/1
150 TABLET, FILM COATED ORAL
Qty: 3 TABLET | Refills: 3 | Status: SHIPPED | OUTPATIENT
Start: 2022-06-22 | End: 2022-10-03 | Stop reason: SDUPTHER

## 2022-06-22 NOTE — PROGRESS NOTES
The patient location is: home  The chief complaint leading to consultation is: results    Visit type: audio only      40 minutes of total time spent on the encounter, which includes face to face time and non-face to face time preparing to see the patient (eg, review of tests), Obtaining and/or reviewing separately obtained history, Documenting clinical information in the electronic or other health record, Independently interpreting results (not separately reported) and communicating results to the patient/family/caregiver, or Care coordination (not separately reported).         Each patient to whom he or she provides medical services by telemedicine is:  (1) informed of the relationship between the physician and patient and the respective role of any other health care provider with respect to management of the patient; and (2) notified that he or she may decline to receive medical services by telemedicine and may withdraw from such care at any time.    Notes:     Internal Medicine Clinic  MICAELA Merritt     Patient Name: Jennifer Hudson   : 1935  MRN:[id number]     CC:  Chief Complaint   Patient presents with    Follow-up     Lab results        HPI  87-year-old  female, presents TM for lab results. Last PCP Dr. Annemarie Flores. Past medical history of hypertension, HLD, Aortic Stenosis, CKD, urinary incontinence, age-related macular degeneration, and osteoporosis. Followed by [facility], and Renal clinic. Renal clinic started her on torsemide 10 mg daily for lower extremity edema, and held hctz/lisinopril. Appears she was lost to Cardiology follow up. Dr. Flores sent a referral to Cardio in 2021 but pt has not been scheduled. Denies chest pain, shortness of breath, cough, fever, headache, dizziness, weakness, abdominal pain, nausea, vomiting, diarrhea, constipation, dysuria, depression, anxiety.                ROS  Review of Systems   Constitutional: Negative.    HENT: Negative.    Eyes: Negative.     Respiratory: Negative.    Cardiovascular: Negative.    Gastrointestinal: Negative.    Endocrine: Negative.    Genitourinary: Negative.    Musculoskeletal: Negative.    Integumentary:  Negative.   Allergic/Immunologic: Negative.    Neurological: Negative.    Hematological: Negative.    Psychiatric/Behavioral: Negative.    All other systems reviewed and are negative.       Physical Examination:  There were no vitals filed for this visit.       Physical Exam  Nursing note reviewed.   Neurological:      Mental Status: She is alert.   Psychiatric:         Mood and Affect: Mood normal.         Behavior: Behavior normal.         Thought Content: Thought content normal.            Labs/Imaging:  Reviewed    Assessment/Plan:  1. Hypertension, unspecified type  - CBC Auto Differential; Future  - Comprehensive Metabolic Panel; Future  - Lipid Panel; Future  - Urinalysis, Reflex to Urine Culture Urine, Clean Catch; Future  DASH diet: Eat more fruits, vegetables, and low fat dairy foods.  (Less than 2 grams of sodium per day).  Maintain healthy weight with goal BMI <30.   Exercise 30 minutes per day 5 days per week.  Home medications refilled and continued.   Home BP monitoring encouraged with BP parameters given.   Sodium Level   Date Value Ref Range Status   06/21/2022 143 136 - 145 mmol/L Final     Potassium Level   Date Value Ref Range Status   06/21/2022 4.9 3.5 - 5.1 mmol/L Final     Blood Urea Nitrogen   Date Value Ref Range Status   06/21/2022 13.3 9.8 - 20.1 mg/dL Final     Creatinine   Date Value Ref Range Status   06/21/2022 0.88 0.55 - 1.02 mg/dL Final     Estimated GFR-Non    Date Value Ref Range Status   06/21/2022 >60 mls/min/1.73/m2 Final         2. Hyperlipidemia, unspecified hyperlipidemia type  - CBC Auto Differential; Future  - Comprehensive Metabolic Panel; Future  - Lipid Panel; Future  - Urinalysis, Reflex to Urine Culture Urine, Clean Catch; Future  Lab Results   Component Value Date     .00 06/21/2022    HDL 54 06/21/2022    TRIG 118 06/21/2022       Cont RX daily; refills given. Take Omega 3 daily.   Stressed importance of dietary modifications. Follow a low cholesterol, low saturated fat diet with less that 200mg of cholesterol a day.  Avoid fried foods and high saturated fats (high saturated fats less than 7% of calories).  Add Flax Seed/Fish Oil supplements to diet. Increase dietary fiber.  Regular exercise can reduce LDL and raise HDL. Stressed importance of physical activity 5 times per week for 30 minutes per day.      3. Obesity, unspecified classification, unspecified obesity type, unspecified whether serious comorbidity present  Goal BMI <30.  Exercise 5 times a week for 30 minutes per day.  Avoid soda, simple sugars, excessive rice, potatoes or bread. Limit fast foods and fried foods.  Choose complex carbs in moderation (example: green vegetables, beans, oatmeal). Eat plenty of fresh fruits and vegetables with lean meats daily.  Do not skip meals. Eat a balanced portion size.  Avoid fad diets. Consider permanent healthy life style changes.     4. Osteoporosis, unspecified osteoporosis type, unspecified pathological fracture presence  - DXA Bone Density Spine And Hip; Future  - Vitamin D; Future  Vit d and calcium at goal  Ca 600 mg BID, and vitamin D3 2000 IU daily. Avoid smoking, no more than 2 ETOH drinks per day, limit caffeine to 1 serving per day, adequate protein intake, regular weight bearing/muscle strengthening exercise as tolerated.    Examples of weight bearing exercises include; walking, hiking, jogging, climbing stairs, playing tennis, and dancing.  5. Vitamin D deficiency  - Vitamin D; Future   Vit d and calcium at goal    Medication List with Changes/Refills   Current Medications    MULTIVIT-MIN/IRON/FOLIC/UHL781 (HAIR, SKIN AND NAILS ADVANCED ORAL)    Take by mouth.    OLOPATADINE (PATANOL) 0.1 % OPHTHALMIC SOLUTION      See Instructions, INSTILL 1 DROP INTO EACH  EYE TWICE DAILY FOR 30 DAYS, # 10 mL, 0 Refill(s), Pharmacy: [facility], 155, cm, Height/Length Dosing, 12/17/21 10:02:00 CST, 77, kg, Weight Dosing, 12/17/21 10:02:00 CST    VIT C/E/CUPERIC/ZINC/LUTEIN (PRESERVISION LUTEIN ORAL)    Take by mouth.   Changed and/or Refilled Medications    Modified Medication Previous Medication    IBANDRONATE (BONIVA) 150 MG TABLET ibandronate (BONIVA) 150 mg tablet       Take 1 tablet (150 mg total) by mouth every 30 days.    Take 150 mg by mouth.    TOLTERODINE (DETROL LA) 4 MG 24 HR CAPSULE tolterodine (DETROL LA) 4 MG 24 hr capsule       Take 1 capsule (4 mg total) by mouth once daily.    Take 4 mg by mouth once daily.      Future Appointments   Date Time Provider Department Center   7/19/2022  1:15 PM [facility], [facility] [place]    11/16/2022  1:30 PM MICAELA Weaver [facility]         Labs thoroughly reviewed with patient. Medication refills addressed today.  RTC prn and 6 months, with labs 1 week prior to the apt.  COVID 19 precautions given to patient.  Patient voices understanding of all discharge instructions.

## 2022-06-23 ENCOUNTER — TELEPHONE (OUTPATIENT)
Dept: INTERNAL MEDICINE | Facility: CLINIC | Age: 87
End: 2022-06-23

## 2022-06-23 NOTE — TELEPHONE ENCOUNTER
----- Message from MICAELA Merritt sent at 6/22/2022  3:33 PM CDT -----  Dr. Flores referred pt to cardio 11/2021, and pt has not been scheduled. Please check on referral. thanks

## 2022-09-12 ENCOUNTER — HOSPITAL ENCOUNTER (OUTPATIENT)
Dept: RADIOLOGY | Facility: HOSPITAL | Age: 87
Discharge: HOME | End: 2022-09-12
Attending: NURSE PRACTITIONER
Payer: MEDICARE

## 2022-09-12 DIAGNOSIS — M81.0 OSTEOPOROSIS, UNSPECIFIED OSTEOPOROSIS TYPE, UNSPECIFIED PATHOLOGICAL FRACTURE PRESENCE: ICD-10-CM

## 2022-09-12 PROCEDURE — 77080 DXA BONE DENSITY AXIAL: CPT | Mod: TC

## 2022-09-20 ENCOUNTER — TELEPHONE (OUTPATIENT)
Dept: INTERNAL MEDICINE | Facility: CLINIC | Age: 87
End: 2022-09-20
Payer: MEDICARE

## 2022-09-20 NOTE — TELEPHONE ENCOUNTER
Pt left a VM asking what is the name of the yeast infection medication that is otc that she you suggested that she should get.

## 2022-09-21 NOTE — TELEPHONE ENCOUNTER
I usually recommend OTC monistat 3 day treatment, if symptoms do not approve, pt should notify us.

## 2022-09-29 ENCOUNTER — LAB VISIT (OUTPATIENT)
Dept: LAB | Facility: HOSPITAL | Age: 87
End: 2022-09-29
Attending: NURSE PRACTITIONER
Payer: MEDICARE

## 2022-09-29 DIAGNOSIS — E55.9 VITAMIN D DEFICIENCY: ICD-10-CM

## 2022-09-29 DIAGNOSIS — E78.5 HYPERLIPIDEMIA, UNSPECIFIED HYPERLIPIDEMIA TYPE: ICD-10-CM

## 2022-09-29 DIAGNOSIS — M81.0 OSTEOPOROSIS, UNSPECIFIED OSTEOPOROSIS TYPE, UNSPECIFIED PATHOLOGICAL FRACTURE PRESENCE: ICD-10-CM

## 2022-09-29 DIAGNOSIS — I10 HYPERTENSION, UNSPECIFIED TYPE: ICD-10-CM

## 2022-09-29 LAB
ALBUMIN SERPL-MCNC: 3.8 GM/DL (ref 3.4–4.8)
ALBUMIN/GLOB SERPL: 1.1 RATIO (ref 1.1–2)
ALP SERPL-CCNC: 89 UNIT/L (ref 40–150)
ALT SERPL-CCNC: 18 UNIT/L (ref 0–55)
AST SERPL-CCNC: 18 UNIT/L (ref 5–34)
BASOPHILS # BLD AUTO: 0.06 X10(3)/MCL (ref 0–0.2)
BASOPHILS NFR BLD AUTO: 1 %
BILIRUBIN DIRECT+TOT PNL SERPL-MCNC: 0.5 MG/DL
BUN SERPL-MCNC: 19.6 MG/DL (ref 9.8–20.1)
CALCIUM SERPL-MCNC: 9.1 MG/DL (ref 8.4–10.2)
CHLORIDE SERPL-SCNC: 106 MMOL/L (ref 98–107)
CHOLEST SERPL-MCNC: 219 MG/DL
CHOLEST/HDLC SERPL: 4 {RATIO} (ref 0–5)
CO2 SERPL-SCNC: 29 MMOL/L (ref 23–31)
CREAT SERPL-MCNC: 0.99 MG/DL (ref 0.55–1.02)
DEPRECATED CALCIDIOL+CALCIFEROL SERPL-MC: 38.2 NG/ML (ref 30–80)
EOSINOPHIL # BLD AUTO: 0.25 X10(3)/MCL (ref 0–0.9)
EOSINOPHIL NFR BLD AUTO: 4.3 %
ERYTHROCYTE [DISTWIDTH] IN BLOOD BY AUTOMATED COUNT: 13.8 % (ref 11.5–17)
GFR SERPLBLD CREATININE-BSD FMLA CKD-EPI: 55 MLS/MIN/1.73/M2
GLOBULIN SER-MCNC: 3.5 GM/DL (ref 2.4–3.5)
GLUCOSE SERPL-MCNC: 96 MG/DL (ref 82–115)
HCT VFR BLD AUTO: 40.4 % (ref 37–47)
HDLC SERPL-MCNC: 56 MG/DL (ref 35–60)
HGB BLD-MCNC: 12.7 GM/DL (ref 12–16)
IMM GRANULOCYTES # BLD AUTO: 0.01 X10(3)/MCL (ref 0–0.04)
IMM GRANULOCYTES NFR BLD AUTO: 0.2 %
LDLC SERPL CALC-MCNC: 140 MG/DL (ref 50–140)
LYMPHOCYTES # BLD AUTO: 1.81 X10(3)/MCL (ref 0.6–4.6)
LYMPHOCYTES NFR BLD AUTO: 31.4 %
MCH RBC QN AUTO: 30.1 PG (ref 27–31)
MCHC RBC AUTO-ENTMCNC: 31.4 MG/DL (ref 33–36)
MCV RBC AUTO: 95.7 FL (ref 80–94)
MONOCYTES # BLD AUTO: 0.62 X10(3)/MCL (ref 0.1–1.3)
MONOCYTES NFR BLD AUTO: 10.7 %
NEUTROPHILS # BLD AUTO: 3 X10(3)/MCL (ref 2.1–9.2)
NEUTROPHILS NFR BLD AUTO: 52.4 %
NRBC BLD AUTO-RTO: 0 %
PLATELET # BLD AUTO: 240 X10(3)/MCL (ref 130–400)
PMV BLD AUTO: 10.3 FL (ref 7.4–10.4)
POTASSIUM SERPL-SCNC: 4.2 MMOL/L (ref 3.5–5.1)
PROT SERPL-MCNC: 7.3 GM/DL (ref 5.8–7.6)
RBC # BLD AUTO: 4.22 X10(6)/MCL (ref 4.2–5.4)
SODIUM SERPL-SCNC: 142 MMOL/L (ref 136–145)
TRIGL SERPL-MCNC: 114 MG/DL (ref 37–140)
VLDLC SERPL CALC-MCNC: 23 MG/DL
WBC # SPEC AUTO: 5.8 X10(3)/MCL (ref 4.5–11.5)

## 2022-09-29 PROCEDURE — 85025 COMPLETE CBC W/AUTO DIFF WBC: CPT

## 2022-09-29 PROCEDURE — 80053 COMPREHEN METABOLIC PANEL: CPT

## 2022-09-29 PROCEDURE — 82306 VITAMIN D 25 HYDROXY: CPT

## 2022-09-29 PROCEDURE — 36415 COLL VENOUS BLD VENIPUNCTURE: CPT

## 2022-09-29 PROCEDURE — 80061 LIPID PANEL: CPT

## 2022-10-03 ENCOUNTER — OFFICE VISIT (OUTPATIENT)
Dept: INTERNAL MEDICINE | Facility: CLINIC | Age: 87
End: 2022-10-03
Payer: MEDICARE

## 2022-10-03 VITALS
SYSTOLIC BLOOD PRESSURE: 108 MMHG | DIASTOLIC BLOOD PRESSURE: 71 MMHG | BODY MASS INDEX: 32.89 KG/M2 | HEART RATE: 89 BPM | TEMPERATURE: 98 F | RESPIRATION RATE: 16 BRPM | HEIGHT: 61 IN | WEIGHT: 174.19 LBS

## 2022-10-03 DIAGNOSIS — I35.0 AORTIC VALVE STENOSIS, ETIOLOGY OF CARDIAC VALVE DISEASE UNSPECIFIED: ICD-10-CM

## 2022-10-03 DIAGNOSIS — E66.9 OBESITY, UNSPECIFIED CLASSIFICATION, UNSPECIFIED OBESITY TYPE, UNSPECIFIED WHETHER SERIOUS COMORBIDITY PRESENT: ICD-10-CM

## 2022-10-03 DIAGNOSIS — Z23 NEED FOR INFLUENZA VACCINATION: ICD-10-CM

## 2022-10-03 DIAGNOSIS — E55.9 VITAMIN D DEFICIENCY: ICD-10-CM

## 2022-10-03 DIAGNOSIS — M81.0 OSTEOPOROSIS, UNSPECIFIED OSTEOPOROSIS TYPE, UNSPECIFIED PATHOLOGICAL FRACTURE PRESENCE: ICD-10-CM

## 2022-10-03 DIAGNOSIS — E78.5 HYPERLIPIDEMIA, UNSPECIFIED HYPERLIPIDEMIA TYPE: ICD-10-CM

## 2022-10-03 DIAGNOSIS — B37.31 YEAST VAGINITIS: ICD-10-CM

## 2022-10-03 DIAGNOSIS — I10 HYPERTENSION, UNSPECIFIED TYPE: ICD-10-CM

## 2022-10-03 DIAGNOSIS — N18.9 CHRONIC KIDNEY DISEASE, UNSPECIFIED CKD STAGE: ICD-10-CM

## 2022-10-03 PROCEDURE — 3288F PR FALLS RISK ASSESSMENT DOCUMENTED: ICD-10-PCS | Mod: CPTII,,, | Performed by: NURSE PRACTITIONER

## 2022-10-03 PROCEDURE — 1159F PR MEDICATION LIST DOCUMENTED IN MEDICAL RECORD: ICD-10-PCS | Mod: CPTII,,, | Performed by: NURSE PRACTITIONER

## 2022-10-03 PROCEDURE — 3288F FALL RISK ASSESSMENT DOCD: CPT | Mod: CPTII,,, | Performed by: NURSE PRACTITIONER

## 2022-10-03 PROCEDURE — G0008 ADMIN INFLUENZA VIRUS VAC: HCPCS | Mod: PBBFAC

## 2022-10-03 PROCEDURE — 1101F PR PT FALLS ASSESS DOC 0-1 FALLS W/OUT INJ PAST YR: ICD-10-PCS | Mod: CPTII,,, | Performed by: NURSE PRACTITIONER

## 2022-10-03 PROCEDURE — 99214 OFFICE O/P EST MOD 30 MIN: CPT | Mod: S$PBB,,, | Performed by: NURSE PRACTITIONER

## 2022-10-03 PROCEDURE — 99214 PR OFFICE/OUTPT VISIT, EST, LEVL IV, 30-39 MIN: ICD-10-PCS | Mod: S$PBB,,, | Performed by: NURSE PRACTITIONER

## 2022-10-03 PROCEDURE — 1160F PR REVIEW ALL MEDS BY PRESCRIBER/CLIN PHARMACIST DOCUMENTED: ICD-10-PCS | Mod: CPTII,,, | Performed by: NURSE PRACTITIONER

## 2022-10-03 PROCEDURE — 1160F RVW MEDS BY RX/DR IN RCRD: CPT | Mod: CPTII,,, | Performed by: NURSE PRACTITIONER

## 2022-10-03 PROCEDURE — 90694 VACC AIIV4 NO PRSRV 0.5ML IM: CPT | Mod: PBBFAC

## 2022-10-03 PROCEDURE — 1159F MED LIST DOCD IN RCRD: CPT | Mod: CPTII,,, | Performed by: NURSE PRACTITIONER

## 2022-10-03 PROCEDURE — 99214 OFFICE O/P EST MOD 30 MIN: CPT | Mod: PBBFAC,25 | Performed by: NURSE PRACTITIONER

## 2022-10-03 PROCEDURE — 1101F PT FALLS ASSESS-DOCD LE1/YR: CPT | Mod: CPTII,,, | Performed by: NURSE PRACTITIONER

## 2022-10-03 PROCEDURE — 1126F AMNT PAIN NOTED NONE PRSNT: CPT | Mod: CPTII,,, | Performed by: NURSE PRACTITIONER

## 2022-10-03 PROCEDURE — 1126F PR PAIN SEVERITY QUANTIFIED, NO PAIN PRESENT: ICD-10-PCS | Mod: CPTII,,, | Performed by: NURSE PRACTITIONER

## 2022-10-03 RX ORDER — PRAVASTATIN SODIUM 20 MG/1
20 TABLET ORAL DAILY
Qty: 90 TABLET | Refills: 2 | Status: SHIPPED | OUTPATIENT
Start: 2022-10-03 | End: 2023-04-03 | Stop reason: SDUPTHER

## 2022-10-03 RX ORDER — IBANDRONATE SODIUM 150 MG/1
150 TABLET, FILM COATED ORAL
Qty: 3 TABLET | Refills: 3 | Status: SHIPPED | OUTPATIENT
Start: 2022-10-03 | End: 2023-04-03 | Stop reason: SDUPTHER

## 2022-10-03 RX ORDER — TOLTERODINE 4 MG/1
4 CAPSULE, EXTENDED RELEASE ORAL DAILY
Qty: 90 CAPSULE | Refills: 1 | Status: SHIPPED | OUTPATIENT
Start: 2022-10-03 | End: 2023-04-03 | Stop reason: SDUPTHER

## 2022-10-03 RX ORDER — FLUCONAZOLE 150 MG/1
150 TABLET ORAL DAILY PRN
Qty: 1 TABLET | Refills: 1 | Status: SHIPPED | OUTPATIENT
Start: 2022-10-03 | End: 2022-10-04

## 2022-10-03 RX ADMIN — INFLUENZA A VIRUS A/VICTORIA/2570/2019 IVR-215 (H1N1) ANTIGEN (FORMALDEHYDE INACTIVATED), INFLUENZA A VIRUS A/DARWIN/6/2021 IVR-227 (H3N2) ANTIGEN (FORMALDEHYDE INACTIVATED), INFLUENZA B VIRUS B/AUSTRIA/1359417/2021 BVR-26 ANTIGEN (FORMALDEHYDE INACTIVATED), INFLUENZA B VIRUS B/PHUKET/3073/2013 BVR-1B ANTIGEN (FORMALDEHYDE INACTIVATED) 0.5 ML: 15; 15; 15; 15 INJECTION, SUSPENSION INTRAMUSCULAR at 02:10

## 2022-10-03 NOTE — PROGRESS NOTES
Internal Medicine Clinic  MICAELA Merritt     Patient Name: Jennifer Hudson   : 1935  MRN:[id number]     CC:  Chief Complaint   Patient presents with    Follow-up     Lab results        HPI  87-year-old  female, presents in clinic for lab results. C/o vaginal irritation and itching last week, took otc monistat with relief. States she tends to get yeast infections often. Request flu vaccine today. Last PCP Dr. Annemarie Flores.   Past medical history of hypertension, HLD, Aortic Stenosis, CKD, urinary incontinence, age-related macular degeneration, and osteoporosis. Followed by [facility] Eye, and Renal clinic. Renal clinic started her on torsemide 10 mg daily for lower extremity edema, and held hctz/lisinopril. Following [facility] CARDIO. DEXA results reviewed 2022; osteopenia.  Boniva once monthly continued. Denies chest pain, shortness of breath, cough, fever, headache, dizziness, weakness, abdominal pain, nausea, vomiting, diarrhea, constipation, dysuria, depression, anxiety.        ROS  Review of Systems   Constitutional: Negative.    HENT: Negative.     Eyes: Negative.    Respiratory: Negative.     Cardiovascular: Negative.    Gastrointestinal: Negative.    Endocrine: Negative.    Genitourinary: Negative.    Musculoskeletal: Negative.    Integumentary:  Negative.   Allergic/Immunologic: Negative.    Neurological: Negative.    Hematological: Negative.    Psychiatric/Behavioral: Negative.     All other systems reviewed and are negative.     Physical Examination:  Vitals:    10/03/22 1300   BP: 108/71   Pulse: 89   Resp: 16   Temp: 98.1 °F (36.7 °C)          Physical Exam  Vitals and nursing note reviewed.   Constitutional:       Appearance: Normal appearance.   HENT:      Head: Normocephalic and atraumatic.      Right Ear: Tympanic membrane, ear canal and external ear normal.      Left Ear: Tympanic membrane, ear canal and external ear normal.      Nose: Nose normal.      Mouth/Throat:      Mouth: Mucous  membranes are moist.      Pharynx: Oropharynx is clear.   Eyes:      Extraocular Movements: Extraocular movements intact.      Conjunctiva/sclera: Conjunctivae normal.      Pupils: Pupils are equal, round, and reactive to light.   Cardiovascular:      Rate and Rhythm: Normal rate and regular rhythm.   Pulmonary:      Effort: Pulmonary effort is normal.      Breath sounds: Normal breath sounds.   Abdominal:      General: Abdomen is flat. Bowel sounds are normal.      Palpations: Abdomen is soft.   Musculoskeletal:         General: Normal range of motion.      Cervical back: Normal range of motion and neck supple.   Skin:     General: Skin is warm and dry.      Capillary Refill: Capillary refill takes less than 2 seconds.   Neurological:      General: No focal deficit present.      Mental Status: She is alert and oriented to person, place, and time. Mental status is at baseline.   Psychiatric:         Mood and Affect: Mood normal.         Behavior: Behavior normal.         Thought Content: Thought content normal.         Judgment: Judgment normal.          Labs/Imaging:  Reviewed    Assessment/Plan:  1. Hypertension, unspecified type  - CBC Auto Differential; Future  - Comprehensive Metabolic Panel; Future  - Lipid Panel; Future  - Urinalysis, Reflex to Urine Culture; Future  BP stable, off medication.  DASH diet: Eat more fruits, vegetables, and low fat dairy foods.  (Less than 2 grams of sodium per day).  Maintain healthy weight with goal BMI <30.   Exercise 30 minutes per day 5 days per week.  Home medications refilled and continued.   Home BP monitoring encouraged with BP parameters given.    Sodium Level   Date Value Ref Range Status   09/29/2022 142 136 - 145 mmol/L Final     Potassium Level   Date Value Ref Range Status   09/29/2022 4.2 3.5 - 5.1 mmol/L Final     Blood Urea Nitrogen   Date Value Ref Range Status   09/29/2022 19.6 9.8 - 20.1 mg/dL Final     Creatinine   Date Value Ref Range Status   09/29/2022  0.99 0.55 - 1.02 mg/dL Final     Estimated GFR-Non    Date Value Ref Range Status   06/21/2022 >60 mls/min/1.73/m2 Final         2. Hyperlipidemia, unspecified hyperlipidemia type  - pravastatin (PRAVACHOL) 20 MG tablet; Take 1 tablet (20 mg total) by mouth once daily.  Dispense: 90 tablet; Refill: 2  - CBC Auto Differential; Future  - Comprehensive Metabolic Panel; Future  - Lipid Panel; Future  - Urinalysis, Reflex to Urine Culture; Future    Lab Results   Component Value Date    .00 09/29/2022    HDL 56 09/29/2022    TRIG 114 09/29/2022     Not at goal.  Start RX pravastatin 20 daily.Take Omega 3 daily.   Stressed importance of dietary modifications. Follow a low cholesterol, low saturated fat diet with less that 200mg of cholesterol a day.  Avoid fried foods and high saturated fats (high saturated fats less than 7% of calories).  Add Flax Seed/Fish Oil supplements to diet. Increase dietary fiber.  Regular exercise can reduce LDL and raise HDL. Stressed importance of physical activity 5 times per week for 30 minutes per day.      3. Osteoporosis, unspecified osteoporosis type, unspecified pathological fracture presence  - Vitamin D; Future  DEXA results reviewed 9/12/2022; osteopenia  Boniva once monthly continued  Denies falls.  Ca 600 mg BID, and vitamin D3 2000 IU daily. Avoid smoking, no more than 2 ETOH drinks per day, limit caffeine to 1 serving per day, adequate protein intake, regular weight bearing/muscle strengthening exercise as tolerated.     Examples of weight bearing exercises include; walking, hiking, jogging, climbing stairs, playing tennis, and dancing.    4. Vitamin D deficiency  - Vitamin D; Future  VIT D at goal at 38.  Ca 9.1 at goal.  Continue otc vit d and calcium    5. Chronic kidney disease, unspecified CKD stage  GFR 55  Keep renal apt on 11/16/2022  Renoprotective measures discussed:    -Comply with renal diet (reduce intake of milk and cheese, dried beans, peas,  wander, nuts and peanut butter) and low sodium diet (2 grams a day)    -Control diabetes (goal A1C <7.0%)    -Control high blood pressure ( goal BP < 130/80, please record BP at home every day and bring log to next office visit)    -Exercise at least 30 minutes a day, 5 days a week.    -Maintain healthy weight.    -Decrease or stop alcohol use    -Do not smoke    -Stay well hydrated    -Receive Pneumovax, Flu, and HBV vaccines if indicated.    -Do not take NSAIDs (Ibuprofen, Naproxen, Aleve, Advil, Toradol, Mobic), may take only Tylenol as needed for pain/headaches.    -Take cholesterol-lowering medications as prescribed (LDL goal <100)    6. Aortic valve stenosis, etiology of cardiac valve disease unspecified  Following Cardiology    7. Obesity, unspecified classification, unspecified obesity type, unspecified whether serious comorbidity present  Goal BMI <30.  Exercise 5 times a week for 30 minutes per day.  Avoid soda, simple sugars, excessive rice, potatoes or bread. Limit fast foods and fried foods.  Choose complex carbs in moderation (example: green vegetables, beans, oatmeal). Eat plenty of fresh fruits and vegetables with lean meats daily.  Do not skip meals. Eat a balanced portion size.  Avoid fad diets. Consider permanent healthy life style changes.     8. Need for influenza vaccination  - influenza (HIGH-DOSE PF) vaccine 0.5 mL     9. Yeast vagintis  UA reviewed  Diflucan prn    Medication List with Changes/Refills   New Medications    FLUCONAZOLE (DIFLUCAN) 150 MG TAB    Take 1 tablet (150 mg total) by mouth daily as needed (yeast).    PRAVASTATIN (PRAVACHOL) 20 MG TABLET    Take 1 tablet (20 mg total) by mouth once daily.   Current Medications    MULTIVIT-MIN/IRON/FOLIC/[id number] (HAIR, SKIN AND NAILS ADVANCED ORAL)    Take by mouth.    OLOPATADINE (PATANOL) 0.1 % OPHTHALMIC SOLUTION      See Instructions, INSTILL 1 DROP INTO EACH EYE TWICE DAILY FOR 30 DAYS, # 10 mL, 0 Refill(s), Pharmacy: [facility]  [facility], 155, cm, Height/Length Dosing, 12/17/21 10:02:00 CST, 77, kg, Weight Dosing, 12/17/21 10:02:00 CST    VIT C/E/CUPERIC/ZINC/LUTEIN (PRESERVISION LUTEIN ORAL)    Take by mouth.   Changed and/or Refilled Medications    Modified Medication Previous Medication    IBANDRONATE (BONIVA) 150 MG TABLET ibandronate (BONIVA) 150 mg tablet       Take 1 tablet (150 mg total) by mouth every 30 days.    Take 1 tablet (150 mg total) by mouth every 30 days.    TOLTERODINE (DETROL LA) 4 MG 24 HR CAPSULE tolterodine (DETROL LA) 4 MG 24 hr capsule       Take 1 capsule (4 mg total) by mouth once daily.    Take 1 capsule (4 mg total) by mouth once daily.        Orders Placed This Encounter   Procedures    CBC Auto Differential    Comprehensive Metabolic Panel    Lipid Panel    Vitamin D    Urinalysis, Reflex to Urine Culture           Future Appointments   Date Time Provider Department Center   10/13/2022  3:00 PM MD MICHELINE [place]   10/25/2022  1:45 PM PROVID[facility], [facility] [place]   11/16/2022  1:30 PM MICAELA Weaver [facility] [place]   12/22/2022  8:45 AM MICAELA Merritt   4/3/2023  1:30 PM MICAELA Merritt        Labs thoroughly reviewed with patient. Medication refills addressed today.  RTC prn and 6 months, with labs 1 week prior to the apt.  COVID 19 precautions given to patient.  Patient voices understanding of all discharge instructions.

## 2022-10-13 ENCOUNTER — OFFICE VISIT (OUTPATIENT)
Dept: CARDIOLOGY | Facility: CLINIC | Age: 87
End: 2022-10-13
Payer: MEDICARE

## 2022-10-13 VITALS
OXYGEN SATURATION: 95 % | RESPIRATION RATE: 20 BRPM | WEIGHT: 170.19 LBS | DIASTOLIC BLOOD PRESSURE: 83 MMHG | SYSTOLIC BLOOD PRESSURE: 123 MMHG | TEMPERATURE: 98 F | HEIGHT: 61 IN | BODY MASS INDEX: 32.13 KG/M2 | HEART RATE: 92 BPM

## 2022-10-13 DIAGNOSIS — I35.0 AORTIC VALVE STENOSIS, ETIOLOGY OF CARDIAC VALVE DISEASE UNSPECIFIED: Primary | ICD-10-CM

## 2022-10-13 PROCEDURE — 93005 ELECTROCARDIOGRAM TRACING: CPT

## 2022-10-13 PROCEDURE — 99214 OFFICE O/P EST MOD 30 MIN: CPT | Mod: PBBFAC | Performed by: INTERNAL MEDICINE

## 2022-10-13 NOTE — PROGRESS NOTES
Cardiology Attending    I evaluated Ms. Jennifer Hudson and discussed the patient's symptoms, findings, and management plan with the resident.  Please see the Cardiology Clinic note for details.

## 2022-10-14 DIAGNOSIS — N18.9 CHRONIC KIDNEY DISEASE, UNSPECIFIED CKD STAGE: Primary | ICD-10-CM

## 2022-11-02 ENCOUNTER — TELEPHONE (OUTPATIENT)
Dept: OPHTHALMOLOGY | Facility: CLINIC | Age: 87
End: 2022-11-02
Payer: MEDICARE

## 2022-11-02 NOTE — TELEPHONE ENCOUNTER
Patient called answering service stating she did not want to cancel her appointment scheduled for tomorrow at 3pm. Noted patient is still on the schedule for this date and time and has not been cancelled. Called patient to clarify message, no answer, no voice mail available.

## 2022-11-03 ENCOUNTER — OFFICE VISIT (OUTPATIENT)
Dept: OPHTHALMOLOGY | Facility: CLINIC | Age: 87
End: 2022-11-03
Payer: MEDICARE

## 2022-11-03 VITALS — BODY MASS INDEX: 32.13 KG/M2 | WEIGHT: 170.19 LBS | HEIGHT: 61 IN

## 2022-11-03 DIAGNOSIS — H35.30 AMD (AGE-RELATED MACULAR DEGENERATION), BILATERAL: ICD-10-CM

## 2022-11-03 DIAGNOSIS — H35.3190 AGE-RELATED MACULAR DEGENERATION WITH CENTRAL GEOGRAPHIC ATROPHY: ICD-10-CM

## 2022-11-03 DIAGNOSIS — H35.372 EPIRETINAL MEMBRANE (ERM) OF LEFT EYE: ICD-10-CM

## 2022-11-03 DIAGNOSIS — H02.886 MEIBOMIAN GLAND DYSFUNCTION (MGD) OF BOTH EYES: ICD-10-CM

## 2022-11-03 DIAGNOSIS — H33.322 RETINAL HOLE OF LEFT EYE: Primary | ICD-10-CM

## 2022-11-03 DIAGNOSIS — H43.22 ASTEROID HYALITIS OF LEFT EYE: ICD-10-CM

## 2022-11-03 DIAGNOSIS — H02.883 MEIBOMIAN GLAND DYSFUNCTION (MGD) OF BOTH EYES: ICD-10-CM

## 2022-11-03 PROCEDURE — 92134 CPTRZ OPH DX IMG PST SGM RTA: CPT | Mod: PBBFAC,PO | Performed by: OPHTHALMOLOGY

## 2022-11-03 PROCEDURE — 99213 OFFICE O/P EST LOW 20 MIN: CPT | Mod: PBBFAC,PO | Performed by: STUDENT IN AN ORGANIZED HEALTH CARE EDUCATION/TRAINING PROGRAM

## 2022-11-03 PROCEDURE — 92134 CPTRZ OPH DX IMG PST SGM RTA: CPT | Mod: PBBFAC,PO | Performed by: STUDENT IN AN ORGANIZED HEALTH CARE EDUCATION/TRAINING PROGRAM

## 2022-11-03 NOTE — PROGRESS NOTES
HPI     Macular Degeneration     Additional comments: Atrophic Hole OS           Comments    6 mth DFE OCT Mac          Last edited by Francheska Ortega MA on 11/3/2022  2:56 PM.        Assessment /Plan     For exam results, see Encounter Report.    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    AMD (age-related macular degeneration), bilateral    Meibomian gland dysfunction (MGD) of both eyes    Epiretinal membrane (ERM) of left eye    Asteroid hyalitis of left eye             OCT mac: 12/17/21  OD: RPE attenuation, disrupted ellipsoid layer with underlying hyperreflectivity of choroid, few PEDs without fluid  OS: RPE attenuation, disrupted ellipsoid layer with underlying hyperreflectivity of choroid, few PEDs without fluid    OCTmac 11/3/22  OD: RPE attenuation, disrupted ellipsoid layer with underlying hyperreflectivity of choroid, trace PEDs without fluid  OS: RPE attenuation, disrupted ellipsoid layer with underlying hyperreflectivity of choroid, few PEDs without fluid  STABLE geographic atrophy  ========================    1. Atrophic hole OS  - Located at 12 o'clock, previously documented  - stable, no subretinal fluid, asymptomatic  - Seen with blem last visit 6/2021  - Will continue to monitor at this time, RD precautions    2. Non-exudative Severe AMD OU  - patient has fovea-involving geographic atrophy in both eyes, no evidence of intraretinal fluid. Ellipsoid layer appears severely disrupted. No evidence of exudative AMD  - AREDS2, no smoking  - Patient may benefit from low vision resources, referral made to [facility] 11/2021  - RTC 6 month general OCT mac, dfe    3. PSK OU  - s/p CE/IOL OD (12/2013) and OS (2/2014)  - stable    4. Blepharitis/MGD OU  - cont WC's, AT's, and LS     5. ERM OS  - CTM    6. Asteroid hyalosis OS  - Monitor    RTC: 9 month OCT mac, DFE, sooner PRN

## 2023-01-04 ENCOUNTER — TELEPHONE (OUTPATIENT)
Dept: INTERNAL MEDICINE | Facility: CLINIC | Age: 88
End: 2023-01-04
Payer: MEDICARE

## 2023-01-04 DIAGNOSIS — R35.0 INCREASED URINARY FREQUENCY: Primary | ICD-10-CM

## 2023-01-05 NOTE — TELEPHONE ENCOUNTER
Pt would like an appointment set up. Pt is not sure if she sees an urologist and I don't see a future appointment with one

## 2023-01-05 NOTE — TELEPHONE ENCOUNTER
Pt will need to collect a urine sample, order is in. Please schedule apt, but will need to collect urine prior to visit.

## 2023-01-06 DIAGNOSIS — N18.9 CHRONIC KIDNEY DISEASE, UNSPECIFIED CKD STAGE: Primary | ICD-10-CM

## 2023-01-19 ENCOUNTER — TELEPHONE (OUTPATIENT)
Dept: INTERNAL MEDICINE | Facility: CLINIC | Age: 88
End: 2023-01-19
Payer: MEDICARE

## 2023-01-24 ENCOUNTER — HOSPITAL ENCOUNTER (OUTPATIENT)
Dept: CARDIOLOGY | Facility: HOSPITAL | Age: 88
Discharge: HOME | End: 2023-01-24
Payer: MEDICARE

## 2023-01-24 VITALS
DIASTOLIC BLOOD PRESSURE: 90 MMHG | BODY MASS INDEX: 32.1 KG/M2 | SYSTOLIC BLOOD PRESSURE: 159 MMHG | HEIGHT: 61 IN | WEIGHT: 170 LBS

## 2023-01-24 DIAGNOSIS — I35.0 AORTIC VALVE STENOSIS, ETIOLOGY OF CARDIAC VALVE DISEASE UNSPECIFIED: ICD-10-CM

## 2023-01-24 LAB
AV INDEX (PROSTH): 0.45
AV MEAN GRADIENT: 15 MMHG
AV PEAK GRADIENT: 23 MMHG
AV VALVE AREA: 1.36 CM2
AV VELOCITY RATIO: 0.41
BSA FOR ECHO PROCEDURE: 1.82 M2
CV ECHO LV RWT: 0.47 CM
DOP CALC AO PEAK VEL: 2.41 M/S
DOP CALC AO VTI: 54 CM
DOP CALC LVOT AREA: 3 CM2
DOP CALC LVOT DIAMETER: 1.96 CM
DOP CALC LVOT PEAK VEL: 1 M/S
DOP CALC LVOT STROKE VOLUME: 73.28 CM3
DOP CALC MV VTI: 33.2 CM
DOP CALCLVOT PEAK VEL VTI: 24.3 CM
E WAVE DECELERATION TIME: 175.48 MSEC
E/A RATIO: 0.59
E/E' RATIO: 17.09 M/S
ECHO LV POSTERIOR WALL: 0.75 CM (ref 0.6–1.1)
EJECTION FRACTION: 65 %
FRACTIONAL SHORTENING: 28 % (ref 28–44)
HR MV ECHO: 79 BPM
INTERVENTRICULAR SEPTUM: 1 CM (ref 0.6–1.1)
LEFT ATRIUM SIZE: 3.14 CM
LEFT ATRIUM VOLUME INDEX MOD: 30.1 ML/M2
LEFT ATRIUM VOLUME MOD: 53 CM3
LEFT INTERNAL DIMENSION IN SYSTOLE: 2.28 CM (ref 2.1–4)
LEFT VENTRICLE DIASTOLIC VOLUME INDEX: 22.81 ML/M2
LEFT VENTRICLE DIASTOLIC VOLUME: 40.14 ML
LEFT VENTRICLE MASS INDEX: 42 G/M2
LEFT VENTRICLE SYSTOLIC VOLUME INDEX: 10.1 ML/M2
LEFT VENTRICLE SYSTOLIC VOLUME: 17.71 ML
LEFT VENTRICULAR INTERNAL DIMENSION IN DIASTOLE: 3.17 CM (ref 3.5–6)
LEFT VENTRICULAR MASS: 73.18 G
LV LATERAL E/E' RATIO: 15.67 M/S
LV SEPTAL E/E' RATIO: 18.8 M/S
LVOT MG: 2.38 MMHG
LVOT MV: 0.73 CM/S
MV MEAN GRADIENT: 4 MMHG
MV PEAK A VEL: 1.6 M/S
MV PEAK E VEL: 0.94 M/S
MV PEAK GRADIENT: 10 MMHG
MV STENOSIS PRESSURE HALF TIME: 50.89 MS
MV VALVE AREA BY CONTINUITY EQUATION: 2.21 CM2
MV VALVE AREA P 1/2 METHOD: 4.32 CM2
PISA MRMAX VEL: 6.46 M/S
RA PRESSURE: 3 MMHG
RA WIDTH: 3.7 CM
TDI LATERAL: 0.06 M/S
TDI SEPTAL: 0.05 M/S
TDI: 0.06 M/S
TRICUSPID ANNULAR PLANE SYSTOLIC EXCURSION: 1.65 CM

## 2023-01-24 PROCEDURE — 93306 TTE W/DOPPLER COMPLETE: CPT

## 2023-03-02 ENCOUNTER — OFFICE VISIT (OUTPATIENT)
Dept: NEPHROLOGY | Facility: CLINIC | Age: 88
End: 2023-03-02
Payer: MEDICARE

## 2023-03-02 VITALS
SYSTOLIC BLOOD PRESSURE: 126 MMHG | DIASTOLIC BLOOD PRESSURE: 83 MMHG | TEMPERATURE: 98 F | RESPIRATION RATE: 18 BRPM | HEART RATE: 88 BPM | WEIGHT: 179.38 LBS | HEIGHT: 61 IN | BODY MASS INDEX: 33.87 KG/M2 | OXYGEN SATURATION: 94 %

## 2023-03-02 DIAGNOSIS — R60.9 EDEMA, UNSPECIFIED TYPE: Primary | ICD-10-CM

## 2023-03-02 PROBLEM — Z96.1 PSEUDOPHAKIA: Status: ACTIVE | Noted: 2023-03-02

## 2023-03-02 PROCEDURE — 1159F MED LIST DOCD IN RCRD: CPT | Mod: CPTII,,, | Performed by: NURSE PRACTITIONER

## 2023-03-02 PROCEDURE — 1159F PR MEDICATION LIST DOCUMENTED IN MEDICAL RECORD: ICD-10-PCS | Mod: CPTII,,, | Performed by: NURSE PRACTITIONER

## 2023-03-02 PROCEDURE — 99214 OFFICE O/P EST MOD 30 MIN: CPT | Mod: PBBFAC | Performed by: NURSE PRACTITIONER

## 2023-03-02 PROCEDURE — 1101F PR PT FALLS ASSESS DOC 0-1 FALLS W/OUT INJ PAST YR: ICD-10-PCS | Mod: CPTII,,, | Performed by: NURSE PRACTITIONER

## 2023-03-02 PROCEDURE — 99213 OFFICE O/P EST LOW 20 MIN: CPT | Mod: S$PBB,,, | Performed by: NURSE PRACTITIONER

## 2023-03-02 PROCEDURE — 99213 PR OFFICE/OUTPT VISIT, EST, LEVL III, 20-29 MIN: ICD-10-PCS | Mod: S$PBB,,, | Performed by: NURSE PRACTITIONER

## 2023-03-02 PROCEDURE — 1101F PT FALLS ASSESS-DOCD LE1/YR: CPT | Mod: CPTII,,, | Performed by: NURSE PRACTITIONER

## 2023-03-02 PROCEDURE — 1125F AMNT PAIN NOTED PAIN PRSNT: CPT | Mod: CPTII,,, | Performed by: NURSE PRACTITIONER

## 2023-03-02 PROCEDURE — 3288F PR FALLS RISK ASSESSMENT DOCUMENTED: ICD-10-PCS | Mod: CPTII,,, | Performed by: NURSE PRACTITIONER

## 2023-03-02 PROCEDURE — 3288F FALL RISK ASSESSMENT DOCD: CPT | Mod: CPTII,,, | Performed by: NURSE PRACTITIONER

## 2023-03-02 PROCEDURE — 1125F PR PAIN SEVERITY QUANTIFIED, PAIN PRESENT: ICD-10-PCS | Mod: CPTII,,, | Performed by: NURSE PRACTITIONER

## 2023-03-02 RX ORDER — TORSEMIDE 10 MG/1
10 TABLET ORAL DAILY
Qty: 90 TABLET | Refills: 3 | Status: SHIPPED | OUTPATIENT
Start: 2023-03-02 | End: 2024-03-01

## 2023-03-02 NOTE — PROGRESS NOTES
"[facility]  Nephrology Clinic Note    Chief complaint: Chronic Kidney Disease (RTC, took meds, edema dahlia le, w/o complaints)    History of present illness:   Jennifer Hudson is a 88 y.o. White female with past medical history of of hypertension, age-related macular degeneration, and osteoporosis. Patient presents for follow up in nephrology clinic today. Denies nausea, vomiting, shortness of breath, chest pain.  C/o BLE swelling which started after she ran out of torsemide.     Review of Systems  12 point review of systems conducted, negative except as stated in the history of present illness.    Allergies: Patient is allergic to codeine.     Past Medical History:  has a past medical history of Macular degeneration.    Procedure History:  has a past surgical history that includes Abdominal surgery (1958); Abdominoplasty (1974); Appendectomy (1943); Reduction of both breasts (1974); Bladder surgery; Oophorectomy; Laparoscopy; and Cataract extraction w/  intraocular lens implant.    Family History: family history includes Hypertension in an other family member; No Known Problems in her father and mother.    Social History:  reports that she has never smoked. She has never used smokeless tobacco. She reports that she does not currently use alcohol. She reports that she does not use drugs.    Physical exam  /83 (BP Location: Right arm, Patient Position: Sitting, BP Method: Large (Automatic))   Pulse 88   Temp 98 °F (36.7 °C) (Oral)   Resp 18   Ht 5' 0.63" (1.54 m)   Wt 81.4 kg (179 lb 6.4 oz)   SpO2 (!) 94%   BMI 34.31 kg/m²   General appearance: Patient is in no acute distress.  Skin: No rashes or wounds.  HEENT: PERRLA, EOMI, no scleral icterus, no JVD. Neck is supple.  Chest: Respirations are unlabored. Lungs sounds are clear.   Heart: S1, S2.   Abdomen: Benign.  : Deferred.  Extremities: No edema, peripheral pulses are palpable.   Neuro: No focal deficits.     Home " Medications:    Current Outpatient Medications:     ibandronate (BONIVA) 150 mg tablet, Take 1 tablet (150 mg total) by mouth every 30 days., Disp: 3 tablet, Rfl: 3    multivit-min/iron/folic/tvg434 (HAIR, SKIN AND NAILS ADVANCED ORAL), Take by mouth., Disp: , Rfl:     olopatadine (PATANOL) 0.1 % ophthalmic solution,  See Instructions, INSTILL 1 DROP INTO EACH EYE TWICE DAILY FOR 30 DAYS, # 10 mL, 0 Refill(s), Pharmacy: [id number], 155, cm, Height/Length Dosing, 12/17/21 10:02:00 CST, 77, kg, Weight Dosing, 12/17/21 10:02:00 CST, Disp: , Rfl:     pravastatin (PRAVACHOL) 20 MG tablet, Take 1 tablet (20 mg total) by mouth once daily., Disp: 90 tablet, Rfl: 2    tolterodine (DETROL LA) 4 MG 24 hr capsule, Take 1 capsule (4 mg total) by mouth once daily., Disp: 90 capsule, Rfl: 1    vit C/E/cuperic/zinc/lutein (PRESERVISION LUTEIN ORAL), Take by mouth., Disp: , Rfl:     torsemide (DEMADEX) 10 MG Tab, Take 1 tablet (10 mg total) by mouth once daily. for 90 days, Disp: 90 tablet, Rfl: 3    Laboratory data    Serum  Lab Results   Component Value Date    WBC 6.0 03/02/2023    HGB 13.7 03/02/2023    HCT 43.3 03/02/2023     03/02/2023     09/29/2022    K 4.2 09/29/2022    CHLORIDE 106 09/29/2022    CO2 29 09/29/2022    BUN 19.6 09/29/2022    CREATININE 0.99 09/29/2022    EGFRNORACEVR 55 09/29/2022    GLUCOSE 96 09/29/2022    CALCIUM 9.1 09/29/2022    ALKPHOS 89 09/29/2022    LABPROT 7.3 09/29/2022    ALBUMIN 3.8 09/29/2022    BILIDIR 0.2 07/01/2021    IBILI 0.50 07/01/2021    AST 18 09/29/2022    ALT 18 09/29/2022      Impression and plan     Edema, unspecified type  -     Comprehensive Metabolic Panel; Future; Expected date: 10/02/2023  -     torsemide (DEMADEX) 10 MG Tab; Take 1 tablet (10 mg total) by mouth once daily. for 90 days  Dispense: 90 tablet; Refill: 3  Renal indices are stable, there is no significant proteinuria. Continue risk factor management and renal sparing activities. Will resume  torsemide.   RTC in 8 months.      BMI 34.0-34.9,adult  Lifestyle and dietary interventions discussed, patient counseled on weight loss using portion control, non- sedentary lifestyle, low-carbohydrate/low fat diet.       3/2/2023  Rhonda Bey NP  [facility]

## 2023-04-03 ENCOUNTER — OFFICE VISIT (OUTPATIENT)
Dept: INTERNAL MEDICINE | Facility: CLINIC | Age: 88
End: 2023-04-03
Payer: MEDICARE

## 2023-04-03 VITALS
DIASTOLIC BLOOD PRESSURE: 70 MMHG | SYSTOLIC BLOOD PRESSURE: 140 MMHG | RESPIRATION RATE: 16 BRPM | HEIGHT: 60 IN | BODY MASS INDEX: 35.57 KG/M2 | WEIGHT: 181.19 LBS | TEMPERATURE: 98 F | HEART RATE: 63 BPM

## 2023-04-03 DIAGNOSIS — N18.31 CHRONIC KIDNEY DISEASE, STAGE 3A: ICD-10-CM

## 2023-04-03 DIAGNOSIS — Z13.29 SCREENING FOR THYROID DISORDER: ICD-10-CM

## 2023-04-03 DIAGNOSIS — E78.5 HYPERLIPIDEMIA, UNSPECIFIED HYPERLIPIDEMIA TYPE: ICD-10-CM

## 2023-04-03 DIAGNOSIS — E55.9 VITAMIN D DEFICIENCY: ICD-10-CM

## 2023-04-03 DIAGNOSIS — E66.01 SEVERE OBESITY (BMI 35.0-39.9) WITH COMORBIDITY: ICD-10-CM

## 2023-04-03 PROCEDURE — 1126F PR PAIN SEVERITY QUANTIFIED, NO PAIN PRESENT: ICD-10-PCS | Mod: CPTII,,, | Performed by: NURSE PRACTITIONER

## 2023-04-03 PROCEDURE — 1101F PT FALLS ASSESS-DOCD LE1/YR: CPT | Mod: CPTII,,, | Performed by: NURSE PRACTITIONER

## 2023-04-03 PROCEDURE — 1101F PR PT FALLS ASSESS DOC 0-1 FALLS W/OUT INJ PAST YR: ICD-10-PCS | Mod: CPTII,,, | Performed by: NURSE PRACTITIONER

## 2023-04-03 PROCEDURE — 99214 PR OFFICE/OUTPT VISIT, EST, LEVL IV, 30-39 MIN: ICD-10-PCS | Mod: S$PBB,,, | Performed by: NURSE PRACTITIONER

## 2023-04-03 PROCEDURE — 1160F RVW MEDS BY RX/DR IN RCRD: CPT | Mod: CPTII,,, | Performed by: NURSE PRACTITIONER

## 2023-04-03 PROCEDURE — 1159F MED LIST DOCD IN RCRD: CPT | Mod: CPTII,,, | Performed by: NURSE PRACTITIONER

## 2023-04-03 PROCEDURE — 3288F FALL RISK ASSESSMENT DOCD: CPT | Mod: CPTII,,, | Performed by: NURSE PRACTITIONER

## 2023-04-03 PROCEDURE — 99214 OFFICE O/P EST MOD 30 MIN: CPT | Mod: PBBFAC | Performed by: NURSE PRACTITIONER

## 2023-04-03 PROCEDURE — 3288F PR FALLS RISK ASSESSMENT DOCUMENTED: ICD-10-PCS | Mod: CPTII,,, | Performed by: NURSE PRACTITIONER

## 2023-04-03 PROCEDURE — 1160F PR REVIEW ALL MEDS BY PRESCRIBER/CLIN PHARMACIST DOCUMENTED: ICD-10-PCS | Mod: CPTII,,, | Performed by: NURSE PRACTITIONER

## 2023-04-03 PROCEDURE — 99214 OFFICE O/P EST MOD 30 MIN: CPT | Mod: S$PBB,,, | Performed by: NURSE PRACTITIONER

## 2023-04-03 PROCEDURE — 1126F AMNT PAIN NOTED NONE PRSNT: CPT | Mod: CPTII,,, | Performed by: NURSE PRACTITIONER

## 2023-04-03 PROCEDURE — 1159F PR MEDICATION LIST DOCUMENTED IN MEDICAL RECORD: ICD-10-PCS | Mod: CPTII,,, | Performed by: NURSE PRACTITIONER

## 2023-04-03 RX ORDER — TOLTERODINE 4 MG/1
4 CAPSULE, EXTENDED RELEASE ORAL DAILY
Qty: 90 CAPSULE | Refills: 1 | Status: SHIPPED | OUTPATIENT
Start: 2023-04-03 | End: 2023-10-12 | Stop reason: SDUPTHER

## 2023-04-03 RX ORDER — PRAVASTATIN SODIUM 40 MG/1
40 TABLET ORAL NIGHTLY
Qty: 90 TABLET | Refills: 2 | Status: SHIPPED | OUTPATIENT
Start: 2023-04-03 | End: 2023-10-12 | Stop reason: SDUPTHER

## 2023-04-03 RX ORDER — IBANDRONATE SODIUM 150 MG/1
150 TABLET, FILM COATED ORAL
Qty: 3 TABLET | Refills: 3 | Status: SHIPPED | OUTPATIENT
Start: 2023-04-03 | End: 2023-10-12 | Stop reason: SDUPTHER

## 2023-04-03 NOTE — PROGRESS NOTES
Internal Medicine Clinic  MICAELA Merritt     Patient Name: Jennifer Hudson   : 1935  MRN:[id number]     Chief Complaint     Chief Complaint   Patient presents with    Follow-up     Lab results        History of Present Illness     88-year-old  female, presents in clinic for lab results. Last PCP Dr. Annemarie Flores. No new complaints voiced. BP borderline elevated today, she did not take torsemide today because it makes her go to the restroom and she did not want this while waiting for apt today.  Past medical history of hypertension, HLD, Aortic Stenosis, CKD, urinary incontinence, age-related macular degeneration, and osteoporosis. Followed by [facility]. Renal clinic prescribes torsemide 10 mg daily for lower extremity edema, hold hctz/lisinopril.   Following [facility]. DEXA results reviewed 2022; osteopenia.  Boniva once monthly continued. Denies chest pain, shortness of breath, cough, fever, headache, dizziness, weakness, abdominal pain, nausea, vomiting, diarrhea, constipation, dysuria, depression, anxiety.            Review of Systems     Review of Systems   Constitutional: Negative.    HENT: Negative.     Eyes: Negative.    Respiratory: Negative.     Cardiovascular: Negative.    Gastrointestinal: Negative.    Endocrine: Negative.    Genitourinary: Negative.    Musculoskeletal: Negative.    Integumentary:  Negative.   Allergic/Immunologic: Negative.    Neurological: Negative.    Hematological: Negative.    Psychiatric/Behavioral: Negative.     All other systems reviewed and are negative.     Physical Examination     Visit Vitals  BP (!) 140/70 (BP Location: Left arm, Patient Position: Sitting, BP Method: Large (Manual))   Pulse 63   Temp 97.6 °F (36.4 °C)   Resp 16   Ht 5' (1.524 m)   Wt 82.2 kg (181 lb 3.2 oz)   BMI 35.39 kg/m²      Wt Readings from Last 3 Encounters:   23 1412 82.2 kg (181 lb 3.2 oz)   23 1245 81.4 kg (179 lb 6.4 oz)   23 1339 77.1 kg (170  lb)          Physical Exam  Vitals and nursing note reviewed.   Constitutional:       Appearance: Normal appearance.   HENT:      Head: Normocephalic and atraumatic.      Right Ear: Tympanic membrane, ear canal and external ear normal.      Left Ear: Tympanic membrane, ear canal and external ear normal.      Nose: Nose normal.      Mouth/Throat:      Mouth: Mucous membranes are moist.      Pharynx: Oropharynx is clear.   Eyes:      Extraocular Movements: Extraocular movements intact.      Conjunctiva/sclera: Conjunctivae normal.      Pupils: Pupils are equal, round, and reactive to light.   Cardiovascular:      Rate and Rhythm: Normal rate and regular rhythm.      Pulses: Normal pulses.      Heart sounds: Normal heart sounds.   Pulmonary:      Effort: Pulmonary effort is normal.      Breath sounds: Normal breath sounds.   Abdominal:      General: Abdomen is flat. Bowel sounds are normal.      Palpations: Abdomen is soft.   Musculoskeletal:         General: Normal range of motion.      Cervical back: Normal range of motion and neck supple.   Skin:     General: Skin is warm and dry.      Capillary Refill: Capillary refill takes less than 2 seconds.   Neurological:      General: No focal deficit present.      Mental Status: She is alert and oriented to person, place, and time. Mental status is at baseline.   Psychiatric:         Mood and Affect: Mood normal.         Behavior: Behavior normal.         Thought Content: Thought content normal.         Judgment: Judgment normal.        Labs / Imaging     Chemistry:  Lab Results   Component Value Date     03/02/2023    K 4.3 03/02/2023    CHLORIDE 107 03/02/2023    BUN 11.8 03/02/2023    CREATININE 0.94 03/02/2023    EGFRNORACEVR 58 03/02/2023    GLUCOSE 97 03/02/2023    CALCIUM 9.5 03/02/2023    ALKPHOS 90 03/02/2023    LABPROT 7.4 03/02/2023    ALBUMIN 3.8 03/02/2023    BILIDIR 0.2 07/01/2021    IBILI 0.50 07/01/2021    AST 18 03/02/2023    ALT 16 03/02/2023     [id number] 37.3 03/02/2023        Lab Results   Component Value Date    HGBA1C 6.1 09/21/2020        Hematology:  Lab Results   Component Value Date    WBC 6.0 03/02/2023    RBC 4.82 03/02/2023    HGB 13.7 03/02/2023    HCT 43.3 03/02/2023    MCV 89.8 03/02/2023    MCH 28.4 03/02/2023    MCHC 31.6 (L) 03/02/2023    RDW 14.8 03/02/2023     03/02/2023    MPV 10.5 (H) 03/02/2023        Lipid Panel:  Lab Results   Component Value Date    CHOL 216 (H) 03/02/2023    HDL 54 03/02/2023    .00 03/02/2023    TRIG 147 (H) 03/02/2023    TOTALCHOLEST 4 03/02/2023        Urine:  Lab Results   Component Value Date    COLORUA Yellow 03/02/2023    APPEARANCEUA Clear 03/02/2023    SGUA 1.027 03/02/2023    PHUA 5.5 03/02/2023    PROTEINUA Trace (A) 03/02/2023    GLUCOSEUA Normal 03/02/2023    KETONESUA Negative 03/02/2023    BLOODUA Negative 03/02/2023    NITRITESUA Negative 03/02/2023    LEUKOCYTESUR 25 (A) 03/02/2023    RBCUA 6-10 (A) 03/02/2023    WBCUA 6-10 (A) 03/02/2023    BACTERIA None Seen 03/02/2023    SQEPUA Occ (A) 03/02/2023    HYALINECASTS None Seen 03/02/2023    CREATRANDUR 169.5 (H) 03/02/2023    PROTEINURINE 23.5 03/02/2023    UPROTCREA 0.1 03/02/2023          Assessment       ICD-10-CM ICD-9-CM   1. Hyperlipidemia, unspecified hyperlipidemia type  E78.5 272.4   2. Vitamin D deficiency  E55.9 268.9   3. Chronic kidney disease, stage 3a  N18.31 585.3   4. Severe obesity (BMI 35.0-39.9) with comorbidity  E66.01 278.01   5. Screening for thyroid disorder  Z13.29 V77.0        Plan   1. Hyperlipidemia, unspecified hyperlipidemia type  Increase pravastatin to 40mg qhs.  Lab Results   Component Value Date    .00 03/02/2023    HDL 54 03/02/2023    TRIG 147 (H) 03/02/2023       Cont RX daily; refills given. Take Omega 3 daily.   Stressed importance of dietary modifications. Follow a low cholesterol, low saturated fat diet with less that 200mg of cholesterol a day.  Avoid fried foods and high saturated  fats (high saturated fats less than 7% of calories).  Add Flax Seed/Fish Oil supplements to diet. Increase dietary fiber.  Regular exercise can reduce LDL and raise HDL. Stressed importance of physical activity 5 times per week for 30 minutes per day.    - pravastatin (PRAVACHOL) 40 MG tablet; Take 1 tablet (40 mg total) by mouth every evening.  Dispense: 90 tablet; Refill: 2  - CBC Auto Differential; Future  - Comprehensive Metabolic Panel; Future  - Lipid Panel; Future  - Urinalysis; Future  - TSH; Future  - Urinalysis    2. Vitamin D deficiency  Vitamin D level reviewed and is currently at goal, between 30-80 ng/mL. Continue OTC Vitamin D3 2000 IU daily.   - Vitamin D; Future    3. Chronic kidney disease, stage 3a  Following Renal.  Continue torsemide (DEMADEX) 10 MG Tab per Renal for edema  Avoid NSAIDS      4. Severe obesity (BMI 35.0-39.9) with comorbidity  Goal BMI <30.  Exercise 5 times a week for 30 minutes per day.  Avoid soda, simple sugars, excessive rice, potatoes or bread. Limit fast foods and fried foods.  Choose complex carbs in moderation (example: green vegetables, beans, oatmeal). Eat plenty of fresh fruits and vegetables with lean meats daily.  Do not skip meals. Eat a balanced portion size.  Avoid fad diets. Consider permanent healthy life style changes.       5. Screening for thyroid disorder    - TSH; Future        Current Outpatient Medications   Medication Instructions    ibandronate (BONIVA) 150 mg, Oral, Every 30 days    multivit-min/iron/folic/mbu209 (HAIR, SKIN AND NAILS ADVANCED ORAL) Oral    olopatadine (PATANOL) 0.1 % ophthalmic solution   See Instructions, INSTILL 1 DROP INTO EACH EYE TWICE DAILY FOR 30 DAYS, # 10 mL, 0 Refill(s), Pharmacy: [id number], 155, cm, Height/Length Dosing, 12/17/21 10:02:00 CST, 77, kg, Weight Dosing, 12/17/21 10:02:00 CST    pravastatin (PRAVACHOL) 40 mg, Oral, Nightly    tolterodine (DETROL LA) 4 mg, Oral, Daily    torsemide (DEMADEX) 10 mg,  Oral, Daily, for 90 days    vit C/E/cuperic/zinc/lutein (PRESERVISION LUTEIN ORAL) Oral       Future Appointments   Date Time Provider Department Center   4/18/2023  1:00 PM Trip Colon MD [facility] CHELLE Arreguin   8/1/2023  2:30 PM PROVIDERS, EMILY [facility]ens   10/5/2023  1:30 PM MICAELA Merritt [facility] INTJANET Arreg[facility]   11/2/2023  1:00 PM MICAELA Weaver [facility] NEPHR Michael Arreg[facility]        Follow up in about 6 months (around 10/3/2023) for lab review.    Labs thoroughly reviewed with patient. Medication refills addressed today.  RTC prn and 6 months, with labs 1 week prior to the apt.  COVID 19 precautions given to patient.  Patient voices understanding of all discharge instructions.      MICAELA Merritt

## 2023-04-11 ENCOUNTER — TELEPHONE (OUTPATIENT)
Dept: INTERNAL MEDICINE | Facility: CLINIC | Age: 88
End: 2023-04-11

## 2023-04-18 ENCOUNTER — OFFICE VISIT (OUTPATIENT)
Dept: CARDIOLOGY | Facility: CLINIC | Age: 88
End: 2023-04-18
Payer: MEDICARE

## 2023-04-18 VITALS
BODY MASS INDEX: 34.12 KG/M2 | DIASTOLIC BLOOD PRESSURE: 73 MMHG | SYSTOLIC BLOOD PRESSURE: 109 MMHG | OXYGEN SATURATION: 96 % | HEIGHT: 60 IN | HEART RATE: 113 BPM | TEMPERATURE: 98 F | WEIGHT: 173.81 LBS | RESPIRATION RATE: 20 BRPM

## 2023-04-18 DIAGNOSIS — I10 PRIMARY HYPERTENSION: Primary | ICD-10-CM

## 2023-04-18 DIAGNOSIS — E78.5 HYPERLIPIDEMIA, UNSPECIFIED HYPERLIPIDEMIA TYPE: ICD-10-CM

## 2023-04-18 DIAGNOSIS — I35.0 NONRHEUMATIC AORTIC VALVE STENOSIS: ICD-10-CM

## 2023-04-18 PROCEDURE — 99213 OFFICE O/P EST LOW 20 MIN: CPT | Mod: PBBFAC | Performed by: INTERNAL MEDICINE

## 2023-04-18 NOTE — PROGRESS NOTES
Cardiology Attending    I evaluated Jennifer Hudson and discussed the patient's symptoms, findings, and management plan with the resident.  Please see the Cardiology note for details.

## 2023-04-18 NOTE — PROGRESS NOTES
[facility] Cardiology Clinic Visit    SUBJECTIVE:      Chief Complaint: f/u denies chest pain or sob and has CARRENO       HPI: Jennifer Hudson is a 88 y.o. female with hx hypertension, HLD, mild aortic stenosis, CKD IIIa, urinary incontinence, age-related macular degeneration, and osteoporosis who presents to Cardiology clinic for follow up, [facility] 10/13/22. Pt does report she gets mild SOB with ambulation > 200 feet but does fine with shorter distances, and has BLE edema from her CKD which resolves with PRN torsemide. Denies chest pain, palpitations, orthopnea, PND, or syncope. Able to perform ADLs at home. Doing well overall.      Past Medical History:   has a past medical history of Macular degeneration.     Past Surgical History:   has a past surgical history that includes Abdominal surgery (1958); Abdominoplasty (1974); Appendectomy (1943); Reduction of both breasts (1974); Bladder surgery; Oophorectomy; Laparoscopy; and Cataract extraction w/  intraocular lens implant.     Family History:  family history includes Hypertension in an other family member; No Known Problems in her father and mother.     Social History:   reports that she has never smoked. She has never used smokeless tobacco. She reports that she does not currently use alcohol. She reports that she does not use drugs.       Allergies:  is allergic to codeine.     Home Medications:  Current Outpatient Medications   Medication Instructions    ibandronate (BONIVA) 150 mg, Oral, Every 30 days    multivit-min/iron/folic/jts038 (HAIR, SKIN AND NAILS ADVANCED ORAL) Oral    olopatadine (PATANOL) 0.1 % ophthalmic solution   See Instructions, INSTILL 1 DROP INTO EACH EYE TWICE DAILY FOR 30 DAYS, # 10 mL, 0 Refill(s), Pharmacy: [facility], 155, cm, Height/Length Dosing, 12/17/21 10:02:00 CST, 77, kg, Weight Dosing, 12/17/21 10:02:00 CST    pravastatin (PRAVACHOL) 40 mg, Oral, Nightly    tolterodine (DETROL LA) 4 mg, Oral, Daily    torsemide (DEMADEX) 10 mg, Oral,  Daily, for 90 days    vit C/E/cuperic/zinc/lutein (PRESERVISION LUTEIN ORAL) Oral        ROS:  Constitutional: no fever, fatigue, weakness  Eye: no vision loss, eye redness, drainage, or pain  ENMT: no sore throat, ear pain, sinus pain/congestion, nasal congestion/drainage  Respiratory: no cough, no wheezing, no shortness of breath  Cardiovascular: no chest pain, no palpitations, no edema  Gastrointestinal: no nausea, vomiting, or diarrhea. No abdominal pain  Genitourinary: no dysuria, no urinary frequency or urgency, no hematuria  Hema/Lymph: no abnormal bruising or bleeding  Endocrine: no heat or cold intolerance, no excessive thirst or excessive urination  Musculoskeletal: no muscle or joint pain, no joint swelling  Integumentary: no skin rash or abnormal lesion  Neurologic: no headache, no dizziness, no weakness or numbness    OBJECTIVE:     Vital signs:   /73 (BP Location: Left arm, Patient Position: Sitting, BP Method: Medium (Automatic))   Pulse (!) 113   Temp 97.9 °F (36.6 °C) (Oral)   Resp 20   Ht 5' (1.524 m)   Wt 78.8 kg (173 lb 12.8 oz)   SpO2 96%   BMI 33.94 kg/m²      Repeat HR 92 bpm, patient had settled down form walking here for clinic visit on repeat.    Physical Examination:  Vital signs and nursing notes reviewed.  General: well-developed well-nourished in no acute distress  Eye: PERRLA, EOMI, clear conjunctiva, eyelids normal  HENT: NC/AT, moist mucus membranes  Neck: full range of motion, no thyromegaly or lymphadenopathy  Respiratory: clear to auscultation bilaterally, respirations non-labored  Cardiovascular: Regular rate and rhythm. 2/6 systolic murmur, rubs, or gallops. Distal pulses are 2+ and symmetric.  Gastrointestinal: soft, non-tender, non-distended with normal bowel sounds, without masses to palpation  Musculoskeletal: no obvious deformities, full range of motion of all extremities/spine without limitation or discomfort  Integumentary: no rashes or skin lesions  present  Extremities: radial and DP pulses 2+ bilaterally, 1+ BLE edema  Neurologic: CN II-XII intact, no signs of peripheral neurological deficit, motor/sensory function intact          Cardiac Hx:     Echo 10/22/22:     The left ventricle is normal in size with concentric remodeling and normal systolic function.  The estimated ejection fraction is 65%.  Diastolic function is at least moderately abnormal due to other indices despite delayed relaxation pattern of mitral inflow.  Normal right ventricular size with normal right ventricular systolic function.  Mild left atrial enlargement.  There is mild aortic valve stenosis.  Aortic valve area is 1.36 cm2; peak velocity is 2.41 m/s; mean gradient is 15 mmHg.  Mild-to-moderate mitral regurgitation.  Normal central venous pressure (3 mmHg).    ASSESSMENT & PLAN:     Assessment and Plan:    Mild aortic stenosis  - TTE as above, EF 65% with mild aortic stenosis (peak velocity 2.41 m/sec; aortic valve area 1.36 cm2, mean gradient 15)  - EKG last visit revealed NSR  - Follow up in Cardiology Clinic in 6 months.     Hyperlipidemia  - Continue Pravastatin 20 mg daily     Return to clinic in 6 months      Tanna Rodriguez MD  [facility] Internal Medicine, -II

## 2023-05-02 ENCOUNTER — TELEPHONE (OUTPATIENT)
Dept: INTERNAL MEDICINE | Facility: CLINIC | Age: 88
End: 2023-05-02
Payer: MEDICARE

## 2023-05-02 DIAGNOSIS — M81.0 OSTEOPOROSIS, UNSPECIFIED OSTEOPOROSIS TYPE, UNSPECIFIED PATHOLOGICAL FRACTURE PRESENCE: Primary | ICD-10-CM

## 2023-05-11 ENCOUNTER — TELEPHONE (OUTPATIENT)
Dept: INTERNAL MEDICINE | Facility: CLINIC | Age: 88
End: 2023-05-11
Payer: MEDICARE

## 2023-05-11 NOTE — TELEPHONE ENCOUNTER
Pt reach out to clinic to find out  about the step she would need to take to get a walker and also wanted know if she can shower chair

## 2023-08-01 ENCOUNTER — OFFICE VISIT (OUTPATIENT)
Dept: OPHTHALMOLOGY | Facility: CLINIC | Age: 88
End: 2023-08-01
Payer: MEDICARE

## 2023-08-01 VITALS — HEIGHT: 60 IN | WEIGHT: 173.75 LBS | BODY MASS INDEX: 34.11 KG/M2

## 2023-08-01 DIAGNOSIS — H43.22 ASTEROID HYALITIS OF LEFT EYE: ICD-10-CM

## 2023-08-01 DIAGNOSIS — H33.322 RETINAL HOLE OF LEFT EYE: Primary | ICD-10-CM

## 2023-08-01 DIAGNOSIS — H35.3190 AGE-RELATED MACULAR DEGENERATION WITH CENTRAL GEOGRAPHIC ATROPHY: ICD-10-CM

## 2023-08-01 DIAGNOSIS — H35.372 EPIRETINAL MEMBRANE (ERM) OF LEFT EYE: ICD-10-CM

## 2023-08-01 DIAGNOSIS — H02.883 MEIBOMIAN GLAND DYSFUNCTION (MGD) OF BOTH EYES: ICD-10-CM

## 2023-08-01 DIAGNOSIS — H02.886 MEIBOMIAN GLAND DYSFUNCTION (MGD) OF BOTH EYES: ICD-10-CM

## 2023-08-01 PROCEDURE — 92133 CPTRZD OPH DX IMG PST SGM ON: CPT | Mod: PBBFAC,PO

## 2023-08-01 PROCEDURE — 92134 CPTRZ OPH DX IMG PST SGM RTA: CPT | Mod: PBBFAC,PO

## 2023-08-01 PROCEDURE — 99213 OFFICE O/P EST LOW 20 MIN: CPT | Mod: PBBFAC,PO

## 2023-08-01 PROCEDURE — 92134 CPTRZ OPH DX IMG PST SGM RTA: CPT | Mod: PBBFAC,PO | Performed by: OPHTHALMOLOGY

## 2023-08-01 NOTE — PROGRESS NOTES
HPI    9 month OCT mac, DFE,  Last edited by Francheska Ortega MA on 8/1/2023  2:17 PM.            Assessment /Plan     For exam results, see Encounter Report.    There are no diagnoses linked to this encounter.    HPI    9 month OCT mac, DFE,  Last edited by Francheska Ortega MA on 8/1/2023  2:17 PM.        Assessment /Plan     For exam results, see Encounter Report.    There are no diagnoses linked to this encounter.             OCT mac: 12/17/21  OD: RPE attenuation, disrupted ellipsoid layer with underlying hyperreflectivity of choroid, few PEDs without fluid  OS: RPE attenuation, disrupted ellipsoid layer with underlying hyperreflectivity of choroid, few PEDs without fluid    OCTmac 11/3/22  OD: RPE attenuation, disrupted ellipsoid layer with underlying hyperreflectivity of choroid, trace PEDs without fluid  OS: RPE attenuation, disrupted ellipsoid layer with underlying hyperreflectivity of choroid, few PEDs without fluid    OCTmac 8/1/23  OD: RPE attenuation, disrupted ellipsoid layer with underlying hyperreflectivity of choroid, trace PEDs without fluid  OS: RPE attenuation, disrupted ellipsoid layer with underlying hyperreflectivity of choroid, few PEDs without fluid  STABLE geographic atrophy    ========================    1. Atrophic hole OS  - Located at 12 o'clock, previously documented  - stable, no subretinal fluid, asymptomatic  - Seen with blem 6/2021  - Will continue to monitor at this time, RD precautions    2. Non-exudative Severe AMD OU  - patient has fovea-involving geographic atrophy in both eyes, no evidence of intraretinal fluid. Ellipsoid layer appears severely disrupted. No evidence of exudative AMD  - AREDS2, no smoking  - Previously referred to low vision services   - RTC 6 month general OCT mac, dfe    3. PSK OU  - s/p CE/IOL OD (12/2013) and OS (2/2014)  - stable    4. Blepharitis/MGD OU  - cont WC's, AT's, and LS     5. ERM OS  - CTM    6. Asteroid hyalosis OS  - Monitor    RTC: 6 months  DFE and OCT mac

## 2023-10-12 ENCOUNTER — OFFICE VISIT (OUTPATIENT)
Dept: INTERNAL MEDICINE | Facility: CLINIC | Age: 88
End: 2023-10-12
Payer: MEDICARE

## 2023-10-12 VITALS
RESPIRATION RATE: 16 BRPM | DIASTOLIC BLOOD PRESSURE: 82 MMHG | TEMPERATURE: 98 F | SYSTOLIC BLOOD PRESSURE: 131 MMHG | HEART RATE: 98 BPM | HEIGHT: 60 IN | WEIGHT: 170 LBS | BODY MASS INDEX: 33.38 KG/M2

## 2023-10-12 DIAGNOSIS — E78.5 HYPERLIPIDEMIA, UNSPECIFIED HYPERLIPIDEMIA TYPE: ICD-10-CM

## 2023-10-12 DIAGNOSIS — Z23 NEED FOR INFLUENZA VACCINATION: ICD-10-CM

## 2023-10-12 DIAGNOSIS — N18.31 CHRONIC KIDNEY DISEASE, STAGE 3A: ICD-10-CM

## 2023-10-12 DIAGNOSIS — E55.9 VITAMIN D DEFICIENCY: ICD-10-CM

## 2023-10-12 DIAGNOSIS — E66.9 OBESITY, UNSPECIFIED CLASSIFICATION, UNSPECIFIED OBESITY TYPE, UNSPECIFIED WHETHER SERIOUS COMORBIDITY PRESENT: ICD-10-CM

## 2023-10-12 PROCEDURE — 90694 VACC AIIV4 NO PRSRV 0.5ML IM: CPT | Mod: PBBFAC

## 2023-10-12 PROCEDURE — 1126F PR PAIN SEVERITY QUANTIFIED, NO PAIN PRESENT: ICD-10-PCS | Mod: CPTII,,, | Performed by: NURSE PRACTITIONER

## 2023-10-12 PROCEDURE — 1160F PR REVIEW ALL MEDS BY PRESCRIBER/CLIN PHARMACIST DOCUMENTED: ICD-10-PCS | Mod: CPTII,,, | Performed by: NURSE PRACTITIONER

## 2023-10-12 PROCEDURE — 99214 OFFICE O/P EST MOD 30 MIN: CPT | Mod: S$PBB,,, | Performed by: NURSE PRACTITIONER

## 2023-10-12 PROCEDURE — 3288F PR FALLS RISK ASSESSMENT DOCUMENTED: ICD-10-PCS | Mod: CPTII,,, | Performed by: NURSE PRACTITIONER

## 2023-10-12 PROCEDURE — 1159F PR MEDICATION LIST DOCUMENTED IN MEDICAL RECORD: ICD-10-PCS | Mod: CPTII,,, | Performed by: NURSE PRACTITIONER

## 2023-10-12 PROCEDURE — 1159F MED LIST DOCD IN RCRD: CPT | Mod: CPTII,,, | Performed by: NURSE PRACTITIONER

## 2023-10-12 PROCEDURE — 1160F RVW MEDS BY RX/DR IN RCRD: CPT | Mod: CPTII,,, | Performed by: NURSE PRACTITIONER

## 2023-10-12 PROCEDURE — 1126F AMNT PAIN NOTED NONE PRSNT: CPT | Mod: CPTII,,, | Performed by: NURSE PRACTITIONER

## 2023-10-12 PROCEDURE — G0008 ADMIN INFLUENZA VIRUS VAC: HCPCS | Mod: PBBFAC

## 2023-10-12 PROCEDURE — 99214 PR OFFICE/OUTPT VISIT, EST, LEVL IV, 30-39 MIN: ICD-10-PCS | Mod: S$PBB,,, | Performed by: NURSE PRACTITIONER

## 2023-10-12 PROCEDURE — 1101F PT FALLS ASSESS-DOCD LE1/YR: CPT | Mod: CPTII,,, | Performed by: NURSE PRACTITIONER

## 2023-10-12 PROCEDURE — 99214 OFFICE O/P EST MOD 30 MIN: CPT | Mod: PBBFAC | Performed by: NURSE PRACTITIONER

## 2023-10-12 PROCEDURE — 1101F PR PT FALLS ASSESS DOC 0-1 FALLS W/OUT INJ PAST YR: ICD-10-PCS | Mod: CPTII,,, | Performed by: NURSE PRACTITIONER

## 2023-10-12 PROCEDURE — 3288F FALL RISK ASSESSMENT DOCD: CPT | Mod: CPTII,,, | Performed by: NURSE PRACTITIONER

## 2023-10-12 RX ORDER — PRAVASTATIN SODIUM 40 MG/1
40 TABLET ORAL NIGHTLY
Qty: 90 TABLET | Refills: 2 | Status: SHIPPED | OUTPATIENT
Start: 2023-10-12 | End: 2024-10-11

## 2023-10-12 RX ORDER — IBANDRONATE SODIUM 150 MG/1
150 TABLET, FILM COATED ORAL
Qty: 3 TABLET | Refills: 3 | Status: SHIPPED | OUTPATIENT
Start: 2023-10-12

## 2023-10-12 RX ORDER — TOLTERODINE 4 MG/1
4 CAPSULE, EXTENDED RELEASE ORAL DAILY
Qty: 90 CAPSULE | Refills: 2 | Status: SHIPPED | OUTPATIENT
Start: 2023-10-12

## 2023-10-12 RX ADMIN — INFLUENZA A VIRUS A/VICTORIA/4897/2022 IVR-238 (H1N1) ANTIGEN (FORMALDEHYDE INACTIVATED), INFLUENZA A VIRUS A/DARWIN/6/2021 IVR-227 (H3N2) ANTIGEN (FORMALDEHYDE INACTIVATED), INFLUENZA B VIRUS B/AUSTRIA/1359417/2021 BVR-26 ANTIGEN (FORMALDEHYDE INACTIVATED), INFLUENZA B VIRUS B/PHUKET/3073/2013 BVR-1B ANTIGEN (FORMALDEHYDE INACTIVATED) 0.5 ML: 15; 15; 15; 15 INJECTION, SUSPENSION INTRAMUSCULAR at 03:10

## 2023-10-12 NOTE — PROGRESS NOTES
Internal Medicine Clinic  MICAELA Merritt     Patient Name: Jennifer Hudson   : 1935  MRN:[id number]     Chief Complaint     Chief Complaint   Patient presents with    Follow-up     Lab review        History of Present Illness     88-year-old  female, presents in clinic for lab results. Last PCP Dr. Annemarie Flores. No new complaints voiced. BP stable today.  Past medical history of hypertension, HLD, Aortic Stenosis, CKD, urinary incontinence, age-related macular degeneration, and osteoporosis. Followed by [facility]. Renal clinic prescribes torsemide 10 mg daily for lower extremity edema, hold hctz/lisinopril.   Following [facility] CARDIO. DEXA results reviewed 2022; osteopenia.  Boniva once monthly continued. Denies chest pain, shortness of breath, cough, fever, headache, dizziness, weakness, abdominal pain, nausea, vomiting, diarrhea, constipation, dysuria, depression, anxiety.                  Review of Systems     Review of Systems   Constitutional: Negative.    HENT: Negative.     Eyes: Negative.    Respiratory: Negative.     Cardiovascular: Negative.    Gastrointestinal: Negative.    Endocrine: Negative.    Genitourinary: Negative.    Musculoskeletal: Negative.    Integumentary:  Negative.   Allergic/Immunologic: Negative.    Neurological: Negative.    Hematological: Negative.    Psychiatric/Behavioral: Negative.     All other systems reviewed and are negative.       Physical Examination     Visit Vitals  /82 (BP Location: Left arm, Patient Position: Sitting, BP Method: Medium (Automatic))   Pulse 98   Temp 98 °F (36.7 °C) (Oral)   Resp 16   Ht 5' (1.524 m)   Wt 77.1 kg (170 lb)   BMI 33.20 kg/m²        BP Readings from Last 6 Encounters:   10/12/23 131/82   23 109/73   23 (!) 140/70   23 126/83   23 (!) 159/90   10/13/22 123/83   ]    Wt Readings from Last 6 Encounters:   10/12/23 77.1 kg (170 lb)   23 78.8 kg (173 lb 11.6 oz)   23 78.8 kg  (173 lb 12.8 oz)   04/03/23 82.2 kg (181 lb 3.2 oz)   03/02/23 81.4 kg (179 lb 6.4 oz)   01/24/23 77.1 kg (170 lb)   ]      Physical Exam  Vitals and nursing note reviewed.   Constitutional:       Appearance: Normal appearance.   HENT:      Head: Normocephalic and atraumatic.      Right Ear: Tympanic membrane, ear canal and external ear normal.      Left Ear: Tympanic membrane, ear canal and external ear normal.      Nose: Nose normal.      Mouth/Throat:      Mouth: Mucous membranes are moist.      Pharynx: Oropharynx is clear.   Eyes:      Extraocular Movements: Extraocular movements intact.      Conjunctiva/sclera: Conjunctivae normal.      Pupils: Pupils are equal, round, and reactive to light.   Cardiovascular:      Rate and Rhythm: Normal rate and regular rhythm.      Pulses: Normal pulses.      Heart sounds: Normal heart sounds.   Pulmonary:      Effort: Pulmonary effort is normal.      Breath sounds: Normal breath sounds.   Abdominal:      General: Abdomen is flat. Bowel sounds are normal.      Palpations: Abdomen is soft.   Musculoskeletal:         General: Normal range of motion.      Cervical back: Normal range of motion and neck supple.   Skin:     General: Skin is warm and dry.      Capillary Refill: Capillary refill takes less than 2 seconds.   Neurological:      General: No focal deficit present.      Mental Status: She is alert and oriented to person, place, and time. Mental status is at baseline.   Psychiatric:         Mood and Affect: Mood normal.         Behavior: Behavior normal.         Thought Content: Thought content normal.         Judgment: Judgment normal.          Labs / Imaging     Chemistry:  Lab Results   Component Value Date     10/12/2023    K 5.2 (H) 10/12/2023    CHLORIDE 105 10/12/2023    BUN 10.2 10/12/2023    CREATININE 0.83 10/12/2023    EGFRNORACEVR >60 10/12/2023    GLUCOSE 108 10/12/2023    CALCIUM 9.6 10/12/2023    ALKPHOS 89 10/12/2023    LABPROT 7.3 10/12/2023     ALBUMIN 3.8 10/12/2023    BILIDIR 0.2 07/01/2021    IBILI 0.50 07/01/2021    AST 19 10/12/2023    ALT 15 10/12/2023    TSIIHBLW04EG 53.0 10/12/2023        Lab Results   Component Value Date    HGBA1C 6.1 09/21/2020        Hematology:  Lab Results   Component Value Date    WBC 6.02 10/12/2023    RBC 4.86 10/12/2023    HGB 14.0 10/12/2023    HCT 44.8 10/12/2023    MCV 92.2 10/12/2023    MCH 28.8 10/12/2023    MCHC 31.3 (L) 10/12/2023    RDW 14.6 10/12/2023     10/12/2023    MPV 10.5 (H) 10/12/2023        Lipid Panel:  Lab Results   Component Value Date    CHOL 220 (H) 10/12/2023    HDL 59 10/12/2023    .00 10/12/2023    TRIG 174 (H) 10/12/2023    TOTALCHOLEST 4 10/12/2023        Urine:  Lab Results   Component Value Date    COLORUA Yellow 03/02/2023    APPEARANCEUA Clear 03/02/2023    SGUA 1.027 03/02/2023    PHUA 5.5 03/02/2023    PROTEINUA Trace (A) 03/02/2023    GLUCOSEUA Normal 03/02/2023    KETONESUA Negative 03/02/2023    BLOODUA Negative 03/02/2023    NITRITESUA Negative 03/02/2023    LEUKOCYTESUR 25 (A) 03/02/2023    RBCUA 6-10 (A) 03/02/2023    WBCUA 6-10 (A) 03/02/2023    BACTERIA None Seen 03/02/2023    SQEPUA Occ (A) 03/02/2023    HYALINECASTS None Seen 03/02/2023    CREATRANDUR 169.5 (H) 03/02/2023    PROTEINURINE 23.5 03/02/2023    UPROTCREA 0.1 03/02/2023          Assessment       ICD-10-CM ICD-9-CM   1. Hyperlipidemia, unspecified hyperlipidemia type  E78.5 272.4   2. Vitamin D deficiency  E55.9 268.9   3. Chronic kidney disease, stage 3a  N18.31 585.3   4. Need for influenza vaccination  Z23 V04.81   5. Obesity, unspecified classification, unspecified obesity type, unspecified whether serious comorbidity present  E66.9 278.00        Plan   1. Hyperlipidemia, unspecified hyperlipidemia type  Lab Results   Component Value Date    .00 10/12/2023    CHOL 220 (H) 10/12/2023    HDL 59 10/12/2023    TRIG 174 (H) 10/12/2023       Cont RX daily; refills given. Take Omega 3 daily.    Stressed importance of dietary modifications. Follow a low cholesterol, low saturated fat diet with less that 200mg of cholesterol a day.  Avoid fried foods and high saturated fats (high saturated fats less than 7% of calories).  Add Flax Seed/Fish Oil supplements to diet. Increase dietary fiber.  Regular exercise can reduce LDL and raise HDL. Stressed importance of physical activity 5 times per week for 30 minutes per day.    - pravastatin (PRAVACHOL) 40 MG tablet; Take 1 tablet (40 mg total) by mouth every evening.  Dispense: 90 tablet; Refill: 2    2. Vitamin D deficiency  Vitamin D level reviewed and is currently at goal, between 30-80 ng/mL. Continue OTC Vitamin D3 2000 IU daily.      3. Chronic kidney disease, stage 3a  Lab Results   Component Value Date    EGFRNORACEVR >60 10/12/2023    EGFRNORACEVR 58 03/02/2023     Following renal  Follow renoprotective measures including Renal Diet (reduce intake of nuts, peanut butter, milk, cheese, dried beans, peas) and Low Sodium Diet (less than 2 grams per day).  Avoid NSAIDs (Aleve, Mobic, Celebrex, Ibuprofen, Advil, Toradol and Diclofenac). May take Tylenol as needed for headache/pain.  Control DM with goal A1C <7. BP goal <130/80. LDL goal < 100.  Stay well hydrated. Avoid alcohol and soda. Limit tea and coffee.  Smoking Cessation recommended.     4. Need for influenza vaccination  Admin today  - influenza 65up-adj (QUADRIVALENT ADJUVANTED PF) vaccine 0.5 mL    5. Obesity, unspecified classification, unspecified obesity type, unspecified whether serious comorbidity present  Goal BMI <30.  Exercise 5 times a week for 30 minutes per day.  Avoid soda, simple sugars, excessive rice, potatoes or bread. Limit fast foods and fried foods.  Choose complex carbs in moderation (example: green vegetables, beans, oatmeal). Eat plenty of fresh fruits and vegetables with lean meats daily.  Do not skip meals. Eat a balanced portion size.  Avoid fad diets. Consider permanent  healthy life style changes.           Current Outpatient Medications   Medication Instructions    ibandronate (BONIVA) 150 mg, Oral, Every 30 days    multivit-min/iron/folic/chx176 (HAIR, SKIN AND NAILS ADVANCED ORAL) Oral    olopatadine (PATANOL) 0.1 % ophthalmic solution   See Instructions, INSTILL 1 DROP INTO EACH EYE TWICE DAILY FOR 30 DAYS, # 10 mL, 0 Refill(s), Pharmacy: [id number], 155, cm, Height/Length Dosing, 12/17/21 10:02:00 CST, 77, kg, Weight Dosing, 12/17/21 10:02:00 CST    pravastatin (PRAVACHOL) 40 mg, Oral, Nightly    tolterodine (DETROL LA) 4 mg, Oral, Daily    torsemide (DEMADEX) 10 mg, Oral, Daily, for 90 days    vit C/E/cuperic/zinc/lutein (PRESERVISION LUTEIN ORAL) Oral       Orders Placed This Encounter   Procedures    CBC Auto Differential    Comprehensive Metabolic Panel    Lipid Panel    Urinalysis    Vitamin D         Future Appointments   Date Time Provider Department Center   11/2/2023  1:00 PM Rhonda Bey FNP [facility]    2/1/2024  2:00 PM [facility]        Follow up in about 6 months (around 4/12/2024) for lab review.    Labs thoroughly reviewed with patient. Medication refills addressed today.  RTC prn and 6 months, with labs 1 week prior to the apt.  COVID 19 precautions given to patient.  Patient voices understanding of all discharge instructions.      MICAELA Merritt

## 2023-11-02 ENCOUNTER — DOCUMENTATION ONLY (OUTPATIENT)
Dept: NEPHROLOGY | Facility: CLINIC | Age: 88
End: 2023-11-02
Payer: MEDICARE

## 2023-11-02 NOTE — PROGRESS NOTES
Patient was no-show to clinic today. Please do not reschedule unless referred again by PCP. Thank you

## 2023-12-01 RX ORDER — OLOPATADINE HYDROCHLORIDE 1 MG/ML
1 SOLUTION/ DROPS OPHTHALMIC 2 TIMES DAILY
Qty: 5 ML | Refills: 11 | OUTPATIENT
Start: 2023-12-01

## 2023-12-01 NOTE — TELEPHONE ENCOUNTER
12/01/2023   2:55 PM  Patient called in regards to the allergy drops that she requested earlier. She was advised to use OTC allergy drops Pataday, or Zaditor . She was told to call our office Monday if she does not get any relief. She voiced understanding

## 2023-12-01 NOTE — TELEPHONE ENCOUNTER
----- Message from Antoinette Shawn sent at 12/1/2023 12:09 PM CST -----  Regarding: Refill  Contact: June Noel  Patient called and request a refill of olopatadine (PATANOL) 0.1 % ophthalmic solution.    Confirmed pharmacy on file     Thanks     12/01/2023 3:14 PM   Indra Hassan MD Lebleu, Anjelique, MA; Lama Alberto MD; Joe Garcias MD  Caller: Unspecified (Today, 12:14 PM)  Indication not seen on previous 2 ophthalmology notes. Please have patient return to clinic to re-evaluate need for this eye drop.

## 2024-04-18 ENCOUNTER — HOSPITAL ENCOUNTER (OUTPATIENT)
Dept: RADIOLOGY | Facility: HOSPITAL | Age: 89
Discharge: HOME | End: 2024-04-18
Attending: NURSE PRACTITIONER
Payer: MEDICARE

## 2024-04-18 ENCOUNTER — OFFICE VISIT (OUTPATIENT)
Dept: INTERNAL MEDICINE | Facility: CLINIC | Age: 89
End: 2024-04-18
Payer: MEDICARE

## 2024-04-18 VITALS
HEIGHT: 60 IN | TEMPERATURE: 98 F | WEIGHT: 172.19 LBS | DIASTOLIC BLOOD PRESSURE: 80 MMHG | SYSTOLIC BLOOD PRESSURE: 121 MMHG | HEART RATE: 103 BPM | BODY MASS INDEX: 33.8 KG/M2 | RESPIRATION RATE: 18 BRPM

## 2024-04-18 DIAGNOSIS — E66.9 OBESITY, UNSPECIFIED CLASSIFICATION, UNSPECIFIED OBESITY TYPE, UNSPECIFIED WHETHER SERIOUS COMORBIDITY PRESENT: ICD-10-CM

## 2024-04-18 DIAGNOSIS — M81.0 OSTEOPOROSIS, UNSPECIFIED OSTEOPOROSIS TYPE, UNSPECIFIED PATHOLOGICAL FRACTURE PRESENCE: ICD-10-CM

## 2024-04-18 DIAGNOSIS — M54.50 ACUTE LOW BACK PAIN WITHOUT SCIATICA, UNSPECIFIED BACK PAIN LATERALITY: ICD-10-CM
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
## 2024-04-18 DIAGNOSIS — M25.552 BILATERAL HIP PAIN: ICD-10-CM

## 2024-04-18 DIAGNOSIS — M25.551 BILATERAL HIP PAIN: ICD-10-CM

## 2024-04-18 PROCEDURE — 3288F FALL RISK ASSESSMENT DOCD: CPT | Mod: CPTII,,, | Performed by: NURSE PRACTITIONER

## 2024-04-18 PROCEDURE — 1101F PT FALLS ASSESS-DOCD LE1/YR: CPT | Mod: CPTII,,, | Performed by: NURSE PRACTITIONER

## 2024-04-18 PROCEDURE — 1159F MED LIST DOCD IN RCRD: CPT | Mod: CPTII,,, | Performed by: NURSE PRACTITIONER

## 2024-04-18 PROCEDURE — 73502 X-RAY EXAM HIP UNI 2-3 VIEWS: CPT | Mod: TC,LT

## 2024-04-18 PROCEDURE — 99215 OFFICE O/P EST HI 40 MIN: CPT | Mod: PBBFAC,25 | Performed by: NURSE PRACTITIONER

## 2024-04-18 PROCEDURE — 99214 OFFICE O/P EST MOD 30 MIN: CPT | Mod: S$PBB,,, | Performed by: NURSE PRACTITIONER

## 2024-04-18 PROCEDURE — 1160F RVW MEDS BY RX/DR IN RCRD: CPT | Mod: CPTII,,, | Performed by: NURSE PRACTITIONER

## 2024-04-18 PROCEDURE — 72110 X-RAY EXAM L-2 SPINE 4/>VWS: CPT | Mod: TC

## 2024-04-18 PROCEDURE — 73502 X-RAY EXAM HIP UNI 2-3 VIEWS: CPT | Mod: TC,RT

## 2024-04-18 PROCEDURE — 72220 X-RAY EXAM SACRUM TAILBONE: CPT | Mod: TC

## 2024-04-18 PROCEDURE — 1125F AMNT PAIN NOTED PAIN PRSNT: CPT | Mod: CPTII,,, | Performed by: NURSE PRACTITIONER

## 2024-04-18 RX ORDER — GABAPENTIN 100 MG/1
100 CAPSULE ORAL 2 TIMES DAILY PRN
Qty: 30 CAPSULE | Refills: 1 | Status: SHIPPED | OUTPATIENT
Start: 2024-04-18 | End: 2024-06-03 | Stop reason: SDUPTHER

## 2024-04-18 NOTE — PROGRESS NOTES
Internal Medicine Clinic  MICAELA Merritt     Patient Name: Jennifer Hudson   : 1935  MRN:[id number]     Chief Complaint     Chief Complaint   Patient presents with    Follow-up      Coccyx/Left hip pain,S/P fall 24        History of Present Illness     89-year-old  female, presents in clinic for a fall 24; getting down from a step ladder, lost her balance; she grabbed the oven door to prevent her fall and fell on her tailbone. Reports dahlia hip and tailbone pain. Pain is worse to the left hip. Denies pain in legs, weakness or numbness. Denies bowel or bladder loss. Vitals stable today. Taking  mg prn pain. Pain is 3/10 today, states she took aspirin today at 11 am. Voices difficulty sitting and standing up right. Most comfortable sitting in her recliner. Clinic WC was used today to assist pt to radiology.    Past medical history of hypertension, HLD, Aortic Stenosis, CKD, urinary incontinence, age-related macular degeneration, and osteoporosis. Followed by [facility]. Renal clinic prescribes torsemide 10 mg daily for lower extremity edema, hold hctz/lisinopril.   Following [facility] CARDIO. DEXA results reviewed 2022; osteopenia.  Boniva once monthly continued. Denies chest pain, shortness of breath, cough, fever, headache, dizziness, weakness, abdominal pain, nausea, vomiting, diarrhea, constipation, dysuria, depression, anxiety.            Review of Systems     Review of Systems   Constitutional: Negative.    HENT: Negative.     Eyes: Negative.    Respiratory: Negative.     Cardiovascular: Negative.    Gastrointestinal: Negative.    Endocrine: Negative.    Genitourinary: Negative.    Musculoskeletal: Negative.    Integumentary:  Negative.   Allergic/Immunologic: Negative.    Neurological: Negative.    Hematological: Negative.    Psychiatric/Behavioral: Negative.     All other systems reviewed and are negative.       Physical Examination     Visit Vitals  /80 (BP  Location: Left arm, Patient Position: Sitting, BP Method: Medium (Automatic))   Pulse 103   Temp 97.5 °F (36.4 °C) (Oral)   Resp 18   Ht 5' (1.524 m)   Wt 78.1 kg (172 lb 2.9 oz)   BMI 33.63 kg/m²        BP Readings from Last 6 Encounters:   04/18/24 121/80   10/12/23 131/82   04/18/23 109/73   04/03/23 (!) 140/70   03/02/23 126/83   01/24/23 (!) 159/90   ]    Wt Readings from Last 6 Encounters:   04/18/24 78.1 kg (172 lb 2.9 oz)   10/12/23 77.1 kg (170 lb)   08/01/23 78.8 kg (173 lb 11.6 oz)   04/18/23 78.8 kg (173 lb 12.8 oz)   04/03/23 82.2 kg (181 lb 3.2 oz)   03/02/23 81.4 kg (179 lb 6.4 oz)   ]    BMI Readings from Last 3 Encounters:   04/18/24 33.63 kg/m²   10/12/23 33.20 kg/m²   08/01/23 33.93 kg/m²         Physical Exam  Vitals and nursing note reviewed.   Constitutional:       Appearance: Normal appearance.   HENT:      Head: Normocephalic and atraumatic.      Right Ear: Tympanic membrane, ear canal and external ear normal.      Left Ear: Tympanic membrane, ear canal and external ear normal.      Nose: Nose normal.      Mouth/Throat:      Mouth: Mucous membranes are moist.      Pharynx: Oropharynx is clear.   Eyes:      Extraocular Movements: Extraocular movements intact.      Conjunctiva/sclera: Conjunctivae normal.      Pupils: Pupils are equal, round, and reactive to light.   Cardiovascular:      Rate and Rhythm: Normal rate and regular rhythm.      Pulses: Normal pulses.      Heart sounds: Normal heart sounds.   Pulmonary:      Effort: Pulmonary effort is normal.      Breath sounds: Normal breath sounds.   Abdominal:      General: Abdomen is flat. Bowel sounds are normal.      Palpations: Abdomen is soft.   Musculoskeletal:         General: Tenderness present. Normal range of motion.      Cervical back: Normal range of motion and neck supple.      Comments: L spine, coccyx and left hip, no swelling or bruising, dahlia lower ext ROM intact     Skin:     General: Skin is warm and dry.      Capillary Refill:  Capillary refill takes less than 2 seconds.   Neurological:      General: No focal deficit present.      Mental Status: She is alert and oriented to person, place, and time. Mental status is at baseline.   Psychiatric:         Mood and Affect: Mood normal.         Behavior: Behavior normal.         Thought Content: Thought content normal.         Judgment: Judgment normal.          Labs / Imaging     Chemistry:  Lab Results   Component Value Date     10/12/2023    K 5.2 (H) 10/12/2023    CHLORIDE 105 10/12/2023    BUN 10.2 10/12/2023    CREATININE 0.83 10/12/2023    EGFRNORACEVR >60 10/12/2023    GLUCOSE 108 10/12/2023    CALCIUM 9.6 10/12/2023    ALKPHOS 89 10/12/2023    LABPROT 7.3 10/12/2023    ALBUMIN 3.8 10/12/2023    BILIDIR 0.2 07/01/2021    IBILI 0.50 07/01/2021    AST 19 10/12/2023    ALT 15 10/12/2023    HYRCJMAL19CU 53.0 10/12/2023        Lab Results   Component Value Date    HGBA1C 6.1 09/21/2020        Hematology:  Lab Results   Component Value Date    WBC 6.02 10/12/2023    RBC 4.86 10/12/2023    HGB 14.0 10/12/2023    HCT 44.8 10/12/2023    MCV 92.2 10/12/2023    MCH 28.8 10/12/2023    MCHC 31.3 (L) 10/12/2023    RDW 14.6 10/12/2023     10/12/2023    MPV 10.5 (H) 10/12/2023        Lipid Panel:  Lab Results   Component Value Date    CHOL 220 (H) 10/12/2023    HDL 59 10/12/2023    .00 10/12/2023    TRIG 174 (H) 10/12/2023    TOTALCHOLEST 4 10/12/2023        Urine:  Lab Results   Component Value Date    COLORUA Yellow 03/02/2023    APPEARANCEUA Clear 03/02/2023    SGUA 1.027 03/02/2023    PHUA 5.5 03/02/2023    PROTEINUA Trace (A) 03/02/2023    GLUCOSEUA Normal 03/02/2023    KETONESUA Negative 03/02/2023    BLOODUA Negative 03/02/2023    NITRITESUA Negative 03/02/2023    LEUKOCYTESUR 25 (A) 03/02/2023    RBCUA 6-10 (A) 03/02/2023    WBCUA 6-10 (A) 03/02/2023    BACTERIA None Seen 03/02/2023    SQEPUA Occ (A) 03/02/2023    HYALINECASTS None Seen 03/02/2023    CREATRANDUR 169.5 (H)  03/02/2023    PROTEINURINE 23.5 03/02/2023    UPROTCREA 0.1 03/02/2023          Assessment       ICD-10-CM ICD-9-CM   1. Acute low back pain without sciatica, unspecified back pain laterality  M54.50 724.2   2. Bilateral hip pain  M25.551 719.45    M25.552    3. Fall (on) (from) other stairs and steps, initial encounter  W10.8XXA E880.9   4. Osteoporosis, unspecified osteoporosis type, unspecified pathological fracture presence  M81.0 733.00   5. Obesity, unspecified classification, unspecified obesity type, unspecified whether serious comorbidity present  E66.9 278.00        Plan   1. Acute low back pain without sciatica, unspecified back pain laterality  Xrays today  Allergic to codeine  RX gabapentin 100 bid prn  Tylenol arthritis prn  Lower back stretches daily.  Avoid strenuous lifting, use proper body mechanics.  Heating pad, Ice pack, Biofreeze or Epsom salt baths as needed.  Exercise to strengthen core muscles to support your back.    2. Bilateral hip pain  Xrays today  Allergic to codeine  RX gabapentin 100 bid prn  Tylenol arthritis prn  Lower back stretches daily.  Avoid strenuous lifting, use proper body mechanics.  Heating pad, Ice pack, Biofreeze or Epsom salt baths as needed.  Exercise to strengthen core muscles to support your back.        3. Fall (on) (from) other stairs and steps, initial encounter  Use a walker at home  Fall precautions discussed  - X-Ray Hip 2 or 3 views Left (with Pelvis when performed); Future  - X-Ray Hip 2 or 3 views Right (with Pelvis when performed); Future  - X-Ray Sacrum And Coccyx; Future  - X-Ray Lumbar Spine 5 View; Future    4. Osteoporosis, unspecified osteoporosis type, unspecified pathological fracture presence  Repeat DEXA-  Ensure adequate calcium and vitamin D, regular exercise, counseling on fall prevention, and avoidance of heavy alcohol use and tobacco use.   -Continue Boniva  - DXA Bone Density Axial Skeleton 1 or more sites; Future    5. Obesity,  unspecified classification, unspecified obesity type, unspecified whether serious comorbidity present  Goal BMI <30.  Exercise 5 times a week for 30 minutes per day.  Avoid soda, simple sugars, excessive rice, potatoes or bread. Limit fast foods and fried foods.  Choose complex carbs in moderation (example: green vegetables, beans, oatmeal). Eat plenty of fresh fruits and vegetables with lean meats daily.  Do not skip meals. Eat a balanced portion size.  Avoid fad diets. Consider permanent healthy life style changes.           Current Outpatient Medications   Medication Instructions    gabapentin (NEURONTIN) 100 mg, Oral, 2 times daily PRN    ibandronate (BONIVA) 150 mg, Oral, Every 30 days    multivit-min/iron/folic/koa365 (HAIR, SKIN AND NAILS ADVANCED ORAL) Oral    olopatadine (PATANOL) 0.1 % ophthalmic solution   See Instructions, INSTILL 1 DROP INTO EACH EYE TWICE DAILY FOR 30 DAYS, # 10 mL, 0 Refill(s), Pharmacy: [facility], 155, cm, Height/Length Dosing, 12/17/21 10:02:00 CST, 77, kg, Weight Dosing, 12/17/21 10:02:00 CST    pravastatin (PRAVACHOL) 40 mg, Oral, Nightly    tolterodine (DETROL LA) 4 mg, Oral, Daily    torsemide (DEMADEX) 10 mg, Oral, Daily, for 90 days    traZODone (DESYREL) 25 mg, Oral, Nightly PRN    vit C/E/cuperic/zinc/lutein (PRESERVISION LUTEIN ORAL) Oral       Orders Placed This Encounter   Procedures    X-Ray Hip 2 or 3 views Left (with Pelvis when performed)    X-Ray Hip 2 or 3 views Right (with Pelvis when performed)    X-Ray Sacrum And Coccyx    X-Ray Lumbar Spine 5 View    DXA Bone Density Axial Skeleton 1 or more sites         Future Appointments   Date Time Provider Department Center   4/24/2024  2:00 PM Rajani De Leon, KAYCEEP [facility]        Follow up in about 2 weeks (around 5/2/2024) for xray results.    Medication refills addressed today.  COVID 19 precautions given to patient.  Patient voices understanding of all discharge instructions.      Rajani FELDER  MICAELA De Leon

## 2024-04-20 ENCOUNTER — TELEPHONE (OUTPATIENT)
Dept: INTERNAL MEDICINE | Facility: CLINIC | Age: 89
End: 2024-04-20
Payer: MEDICARE

## 2024-04-20 NOTE — TELEPHONE ENCOUNTER
Please let pt know that hip xrays were reviewed and there are no fractures from her fall. I am waiting for final interpretation by radiologist of Lspine and coccyx xray views. Please call radiology and let them know these images were ordered/taken on Thursday but not read. I sent gabapentin for pain as needed due to her codeine allergy.  Thanks

## 2024-04-22 ENCOUNTER — TELEPHONE (OUTPATIENT)
Dept: INTERNAL MEDICINE | Facility: CLINIC | Age: 89
End: 2024-04-22
Payer: MEDICARE

## 2024-04-22 NOTE — TELEPHONE ENCOUNTER
Called pt, no answer. [place]  [facility] [place]     [place]ed radiology and spoke with Toro. Stated pt's Lspine and coccyx xray views has been read and  final interpretation has been determined.

## 2024-04-22 NOTE — TELEPHONE ENCOUNTER
----- Message from Haley Mas sent at 4/22/2024  2:23 PM CDT -----  Type:  Needs Medical Advice    Who Called:  pt      Would the patient rather a call back or a response via [facility]?     Best Call Back Number:  [phone]    Additional Information:  pt called to discuss about a RX for lumbar brace, please advise, thanks

## 2024-04-30 ENCOUNTER — TELEPHONE (OUTPATIENT)
Dept: INTERNAL MEDICINE | Facility: CLINIC | Age: 89
End: 2024-04-30
Payer: MEDICARE

## 2024-04-30 DIAGNOSIS — M48.50XA WEDGING OF VERTEBRA, INITIAL ENCOUNTER: ICD-10-CM

## 2024-04-30 DIAGNOSIS — M80.80XA OTHER OSTEOPOROSIS WITH CURRENT PATHOLOGICAL FRACTURE, INITIAL ENCOUNTER: ICD-10-CM

## 2024-04-30 DIAGNOSIS — M54.50 ACUTE LOW BACK PAIN WITHOUT SCIATICA, UNSPECIFIED BACK PAIN LATERALITY: ICD-10-CM

## 2024-04-30 NOTE — TELEPHONE ENCOUNTER
My apologies. I called pt and she said it isDr. Dwayne Matute MD Diagnostic Radiology at [facility]. Ph number is [phone].

## 2024-04-30 NOTE — TELEPHONE ENCOUNTER
----- Message from MICAELA Merritt sent at 4/30/2024 12:50 PM CDT -----  Can I have more contact information regarding this referral. I can not find a Dr. Cruz in the system. Address, phone? Fax?  ----- Message -----  From: Miriam Hathaway MA  Sent: 4/30/2024   8:44 AM CDT  To: MICAELA Merritt      Please advise!  ----- Message -----  From: Logan Burns  Sent: 4/29/2024  11:35 AM CDT  To: Moises FELDER Staff    Type:  Needs Medical Advice    Who Called: pt     Best Call Back Number:  [phone]  Additional Information: pt  called would like referral sent  to Dr. Cruz, due to back fracture. Please call to follow up stated [facility]

## 2024-04-30 NOTE — TELEPHONE ENCOUNTER
This provider Dr. Dwayne Matute is with diagnostic radiology. Does not appear he sees pt in clinic setting for back related concerns. Generally for back fracture or wedging related to osteoporosis from a fall, we refer to Neurosurgery or a back specialist. Why is she requesting this particular doctor? Did insurance give her this doctors name or ?  Please help her find a NEURO SURGERY or orthopedic back specialist in her network, then I can properly refer her.  thanks

## 2024-04-30 NOTE — TELEPHONE ENCOUNTER
----- Message from Miriam Hathaway MA sent at 4/30/2024  9:02 AM CDT -----  Hey, I am not seeing any referrals in pts chart for reason listed below. Please advise.  ----- Message -----  From: Logan Burns  Sent: 4/30/2024   8:46 AM CDT  To: Moises FELDER Staff    Pt called to follow up  on referral , asked for a call back for update on referral status . Pt stated Dr justice Matute ([facility] physician)  ----- Message -----  From: Logan Burns  Sent: 4/29/2024  11:35 AM CDT  To: Moiess FELDER Staff    Type:  Needs Medical Advice    Who Called: pt     Best Call Back Number:  [phone]  Additional Information: pt  called would like referral sent  to Dr. Cruz, due to back fracture. Please call to follow up [facility]

## 2024-05-02 ENCOUNTER — TELEPHONE (OUTPATIENT)
Dept: INTERNAL MEDICINE | Facility: CLINIC | Age: 89
End: 2024-05-02
Payer: MEDICARE

## 2024-05-02 NOTE — TELEPHONE ENCOUNTER
Called pt and advised her to call her insurance company to assist with finding someone in-network for Neuro or Ortho. Advised pt to return call to clinic with information so we can get referral  sent out. Pt verbalized understanding.

## 2024-05-02 NOTE — TELEPHONE ENCOUNTER
----- Message from Ana Arzola sent at 5/2/2024  1:29 PM CDT -----  Regarding: advice  .Type:  Needs Medical Advice    Who Called: pt    Symptoms (please be specific):      How long has patient had these symptoms:      Pharmacy name and phone #:      Would the patient rather a call back or a response via [facility]? Jaswinder    Best Call Back Number: [phone]    Additional Information: pt is calling in regards to a back fracture; she states that she has not received a callback in regards to her request a few days ago; please provide pt with an update; thanks.

## 2024-05-06 ENCOUNTER — TELEPHONE (OUTPATIENT)
Dept: INTERNAL MEDICINE | Facility: CLINIC | Age: 89
End: 2024-05-06
Payer: MEDICARE

## 2024-05-06 NOTE — TELEPHONE ENCOUNTER
Spoke to pt. Pt says she is unsure if she has refill on her Gabapentin . Informed pt she has a refill on this medication. Pt  verbalized understanding .

## 2024-05-08 ENCOUNTER — TELEPHONE (OUTPATIENT)
Dept: INTERNAL MEDICINE | Facility: CLINIC | Age: 89
End: 2024-05-08
Payer: MEDICARE
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
## 2024-05-08 DIAGNOSIS — M54.50 ACUTE LOW BACK PAIN WITHOUT SCIATICA, UNSPECIFIED BACK PAIN LATERALITY: ICD-10-CM

## 2024-05-08 DIAGNOSIS — M48.50XA WEDGING OF VERTEBRA, INITIAL ENCOUNTER: Primary | ICD-10-CM

## 2024-05-08 NOTE — TELEPHONE ENCOUNTER
----- Message from Bailey Collier sent at 5/6/2024  4:08 PM CDT -----  Regarding: referral  Patient called and stated the below provider accepts her insurance.  Please send referral    [facility] [phone]

## 2024-05-08 NOTE — TELEPHONE ENCOUNTER
----- Message from Bailey Collier sent at 5/6/2024  4:08 PM CDT -----  Regarding: referral  Patient called and stated the below provider accepts her insurance.  Please send referral    [facility] [phone]

## 2024-05-08 NOTE — TELEPHONE ENCOUNTER
[facility] is not a clinic, they offering imaging services. I will send referral to [facility]. Please give pt phone number and make her aware of referral. [email or web address]

## 2024-05-10 NOTE — TELEPHONE ENCOUNTER
Called and spoke to pt. Informed pt [facility] is not a clinic, they offer imaging services. Informed her PCP will send referral to [facility]. Provided pt phone number and made her aware of referral .

## 2024-05-30 ENCOUNTER — OFFICE VISIT (OUTPATIENT)
Dept: ORTHOPEDICS | Facility: CLINIC | Age: 89
End: 2024-05-30
Payer: MEDICARE

## 2024-05-30 VITALS — HEIGHT: 60 IN | WEIGHT: 174.19 LBS | BODY MASS INDEX: 34.2 KG/M2

## 2024-05-30 DIAGNOSIS — M51.36 DDD (DEGENERATIVE DISC DISEASE), LUMBAR: ICD-10-CM

## 2024-05-30 DIAGNOSIS — M47.816 LUMBAR SPONDYLOSIS: ICD-10-CM

## 2024-05-30 DIAGNOSIS — M81.0 OSTEOPOROSIS, UNSPECIFIED OSTEOPOROSIS TYPE, UNSPECIFIED PATHOLOGICAL FRACTURE PRESENCE: ICD-10-CM

## 2024-05-30 DIAGNOSIS — S32.010A CLOSED COMPRESSION FRACTURE OF BODY OF L1 VERTEBRA: Primary | ICD-10-CM

## 2024-05-30 PROCEDURE — 99204 OFFICE O/P NEW MOD 45 MIN: CPT | Mod: S$GLB,,, | Performed by: ORTHOPAEDIC SURGERY

## 2024-05-30 PROCEDURE — 1159F MED LIST DOCD IN RCRD: CPT | Mod: CPTII,S$GLB,, | Performed by: ORTHOPAEDIC SURGERY

## 2024-05-30 PROCEDURE — 99999 PR PBB SHADOW E&M-EST. PATIENT-LVL III: CPT | Mod: PBBFAC,,, | Performed by: ORTHOPAEDIC SURGERY

## 2024-05-30 PROCEDURE — 1125F AMNT PAIN NOTED PAIN PRSNT: CPT | Mod: CPTII,S$GLB,, | Performed by: ORTHOPAEDIC SURGERY

## 2024-05-30 PROCEDURE — 1100F PTFALLS ASSESS-DOCD GE2>/YR: CPT | Mod: CPTII,S$GLB,, | Performed by: ORTHOPAEDIC SURGERY

## 2024-05-30 PROCEDURE — 99999 PR PBB SHADOW E&M-EST. PATIENT-LVL II: CPT | Mod: PBBFAC,,, | Performed by: PHYSICIAN ASSISTANT

## 2024-05-30 PROCEDURE — 99499 UNLISTED E&M SERVICE: CPT | Mod: S$GLB,,, | Performed by: PHYSICIAN ASSISTANT

## 2024-05-30 PROCEDURE — 3288F FALL RISK ASSESSMENT DOCD: CPT | Mod: CPTII,S$GLB,, | Performed by: ORTHOPAEDIC SURGERY

## 2024-05-30 PROCEDURE — 1160F RVW MEDS BY RX/DR IN RCRD: CPT | Mod: CPTII,S$GLB,, | Performed by: ORTHOPAEDIC SURGERY

## 2024-05-30 RX ORDER — CELECOXIB 200 MG/1
200 CAPSULE ORAL DAILY
Qty: 60 CAPSULE | Refills: 1 | Status: SHIPPED | OUTPATIENT
Start: 2024-05-30

## 2024-05-30 NOTE — PROGRESS NOTES
DATE: 5/30/2024  PATIENT: Jennifer Hudson    Attending Physician: Aayush Mishra M.D.    CHIEF COMPLAINT: LBP    HISTORY:  Jennifer Hudson is a 89 y.o. female w/ a hx of osteoporosis (takes Boniva) and coccygectomy presents for initial evaluation of low back pain (Back - 9). The pain has been present for 2 months after a fall. The patient describes the pain as dull but it does not go down legs.  The pain is worse with activity and improved by rest. There is no associated numbness and tingling. There is no subjective weakness. Prior treatments have included meds (gabapentin), but no PT, GERHARD or surgery. She is interested in kyphoplasty.    The Patient denies myelopathic symptoms such as handwriting changes or difficulty with buttons/coins/keys. Denies perineal paresthesias, bowel/bladder dysfunction.    The patient does not smoke, have DM or endorse IVDU. The patient is not on any blood thinners and does not take chronic narcotics. She is a retired skin therapist.    PAST MEDICAL/SURGICAL HISTORY:  Past Medical History:   Diagnosis Date    Macular degeneration      Past Surgical History:   Procedure Laterality Date    ABDOMINAL SURGERY  1958    ABDOMINOPLASTY  1974    APPENDECTOMY  1943    BLADDER SURGERY      CATARACT EXTRACTION W/  INTRAOCULAR LENS IMPLANT      LAPAROSCOPY      OOPHORECTOMY      REDUCTION OF BOTH BREASTS  1974       Current Medications:   Current Outpatient Medications:     gabapentin (NEURONTIN) 100 MG capsule, Take 1 capsule (100 mg total) by mouth 2 (two) times daily as needed (pain)., Disp: 30 capsule, Rfl: 1    ibandronate (BONIVA) 150 mg tablet, Take 1 tablet (150 mg total) by mouth every 30 days., Disp: 3 tablet, Rfl: 3    multivit-min/iron/folic/xif420 (HAIR, SKIN AND NAILS ADVANCED ORAL), Take by mouth., Disp: , Rfl:     olopatadine (PATANOL) 0.1 % ophthalmic solution,  See Instructions, INSTILL 1 DROP INTO EACH EYE TWICE DAILY FOR 30 DAYS, # 10 mL, 0 Refill(s), Pharmacy: [facility], 155, cm,  Height/Length Dosing, 12/17/21 10:02:00 CST, 77, kg, Weight Dosing, 12/17/21 10:02:00 CST, Disp: , Rfl:     pravastatin (PRAVACHOL) 40 MG tablet, Take 1 tablet (40 mg total) by mouth every evening., Disp: 90 tablet, Rfl: 2    tolterodine (DETROL LA) 4 MG 24 hr capsule, Take 1 capsule (4 mg total) by mouth once daily., Disp: 90 capsule, Rfl: 2    traZODone (DESYREL) 50 MG tablet, Take 0.5 tablets (25 mg total) by mouth nightly as needed for Insomnia., Disp: 15 tablet, Rfl: 6    vit C/E/cuperic/zinc/lutein (PRESERVISION LUTEIN ORAL), Take by mouth., Disp: , Rfl:     celecoxib (CELEBREX) 200 MG capsule, Take 1 capsule (200 mg total) by mouth once daily., Disp: 60 capsule, Rfl: 1    torsemide (DEMADEX) 10 MG Tab, Take 1 tablet (10 mg total) by mouth once daily. for 90 days, Disp: 90 tablet, Rfl: 3    Social History:   Social History     Socioeconomic History    Marital status:     Number of children: 2   Occupational History    Occupation: Retired   Tobacco Use    Smoking status: Never    Smokeless tobacco: Never   Substance and Sexual Activity    Alcohol use: Not Currently     Comment: past history    Drug use: Never     Social Determinants of Health     Financial Resource Strain: Low Risk  (10/12/2023)    Overall Financial Resource Strain (CARDIA)     Difficulty of Paying Living Expenses: Not very hard   Food Insecurity: No Food Insecurity (10/12/2023)    Hunger Vital Sign     Worried About Running Out of Food in the Last Year: Never true     Ran Out of Food in the Last Year: Never true   Transportation Needs: No Transportation Needs (10/12/2023)    PRAPARE - Transportation     Lack of Transportation (Medical): No     Lack of Transportation (Non-Medical): No   Physical Activity: Inactive (10/12/2023)    Exercise Vital Sign     Days of Exercise per Week: 0 days     Minutes of Exercise per Session: 0 min   Stress: No Stress Concern Present (10/12/2023)    [employer] [place] [employer] - Occupational  Stress Questionnaire     Feeling of Stress : Only a little   Housing Stability: Low Risk  (10/12/2023)    Housing Stability Vital Sign     Unable to Pay for Housing in the Last Year: No     Number of Places Lived in the Last Year: 1     Unstable Housing in the Last Year: No       REVIEW OF SYSTEMS:  Constitution: Negative. Negative for chills, fever and night sweats.   Cardiovascular: Negative for chest pain and syncope.   Respiratory: Negative for cough and shortness of breath.   Gastrointestinal: See HPI. Negative for nausea/vomiting. Negative for abdominal pain.  Genitourinary: See HPI. Negative for discoloration or dysuria.  Skin: Negative for dry skin, itching and rash.   Hematologic/Lymphatic: negative for bleeding/clotting disorders.   Musculoskeletal: Negative for falls and muscle weakness.   Neurological: See HPI. no history of seizures. no history of cranial surgery or shunts.  Endocrine: Negative for polydipsia, polyphagia and polyuria.   Allergic/Immunologic: Negative for hives and persistent infections.    PHYSICAL EXAMINATION:    Ht 5' (1.524 m)   Wt 79 kg (174 lb 3.2 oz)   BMI 34.02 kg/m²     General: The patient is a 89 y.o. female in no apparent distress, the patient is orientatied to person, place and time.   Psych: Normal mood and affect  HEENT: Vision grossly intact, hearing intact to the spoken word.  Lungs: Respirations unlabored.  Gait: Normal station and gait, no difficulty with toe or heel walk.   Skin: Dorsal lumbar skin negative for rashes, lesions, hairy patches and surgical scars.  Range of motion: Lumbar range of motion is acceptable. There is lumbar tenderness to palpation.  Spinal Balance: Global saggital and coronal spinal balance acceptable, no significant for scoliosis and kyphosis.  Musculoskeletal: No pain with the range of motion of the bilateral hips. No trochanteric tenderness to palpation.  Vascular: Bilateral lower extremities warm and well perfused, Dorsalis pedis pulses  2+ bilaterally.  Neurological: Normal strength and tone in all major motor groups in the bilateral lower extremities except for 4/5 in b/l HF. Normal sensation to light touch in the L2-S1 dermatomes bilaterally.  Deep tendon reflexes symmetric 2+ in the bilateral lower extremities.  Negative Babinski bilaterally.    IMAGING:   Today I independently reviewed the following images and my interpretations are as follows:    AP, Lat and Flex/Ex upright L-spine films demonstrate spondylosis and DDD. L1 VCF.      Body mass index is 34.02 kg/m².  Hemoglobin A1c   Date Value Ref Range Status   09/21/2020 6.1 4.2 - 6.3 % Final   07/09/2019 5.7 4.2 - 6.3 % Final         ASSESSMENT/PLAN:    Jennifer was seen today for low-back pain.    Diagnoses and all orders for this visit:    Closed compression fracture of body of L1 vertebra  -     celecoxib (CELEBREX) 200 MG capsule; Take 1 capsule (200 mg total) by mouth once daily.  -     Ambulatory referral/consult to Pain Clinic; Future    Lumbar spondylosis    DDD (degenerative disc disease), lumbar      Follow up if symptoms worsen or fail to improve.    Patient has L1 VCF. I discussed the natural history of their diagnoses as well as surgical and nonsurgical treatment options. I educated the patient on the importance of core/back strengthening, correct posture, bending/lifting ergonomics, and low-impact aerobic exercises (walking, elliptical, and aquatherapy). I prescribed celebrex. Continue medications. I will refer the patient to [facility] for bone health optimization. I referred pt to Pain Mgmt for possible L1 kyphoplasty. Patient will follow up PRN.    Aayush Mishra MD  Orthopaedic Spine Surgeon  Department of Orthopaedic Surgery  [phone]

## 2024-05-30 NOTE — PROGRESS NOTES
Patient was initially scheduled to be evaluated for osteoporosis.  She is currently under treatment for osteoporosis by her primary care and is scheduled to see them later this week would recommend increasing her medication to Prolia or Reclast  Will be a no-charge visit

## 2024-05-31 ENCOUNTER — OFFICE VISIT (OUTPATIENT)
Dept: PAIN MEDICINE | Facility: CLINIC | Age: 89
End: 2024-05-31
Payer: MEDICARE

## 2024-05-31 VITALS
HEART RATE: 97 BPM | SYSTOLIC BLOOD PRESSURE: 139 MMHG | WEIGHT: 174 LBS | HEIGHT: 60 IN | DIASTOLIC BLOOD PRESSURE: 81 MMHG | BODY MASS INDEX: 34.16 KG/M2

## 2024-05-31 DIAGNOSIS — S32.010A CLOSED COMPRESSION FRACTURE OF BODY OF L1 VERTEBRA: ICD-10-CM

## 2024-05-31 PROCEDURE — 99203 OFFICE O/P NEW LOW 30 MIN: CPT | Mod: ,,, | Performed by: ANESTHESIOLOGY

## 2024-05-31 PROCEDURE — 1125F AMNT PAIN NOTED PAIN PRSNT: CPT | Mod: CPTII,,, | Performed by: ANESTHESIOLOGY

## 2024-05-31 PROCEDURE — 1160F RVW MEDS BY RX/DR IN RCRD: CPT | Mod: CPTII,,, | Performed by: ANESTHESIOLOGY

## 2024-05-31 PROCEDURE — 1159F MED LIST DOCD IN RCRD: CPT | Mod: CPTII,,, | Performed by: ANESTHESIOLOGY

## 2024-05-31 PROCEDURE — 3288F FALL RISK ASSESSMENT DOCD: CPT | Mod: CPTII,,, | Performed by: ANESTHESIOLOGY

## 2024-05-31 PROCEDURE — 1100F PTFALLS ASSESS-DOCD GE2>/YR: CPT | Mod: CPTII,,, | Performed by: ANESTHESIOLOGY

## 2024-05-31 NOTE — PROGRESS NOTES
Ilda Sapp MD        PATIENT NAME: Jennifer Hudson  : 1935  DATE: 24  MRN: [id number]      Billing Provider: Ilda Sapp MD  Level of Service:   Patient PCP Information       Provider PCP Type    [facility]            Reason for Visit / Chief Complaint: Back Pain (Pt having lower back pain, pain started when back was fractured almost 2 months ago, Taking pain meds,C/O pain level 8. Seeing DR Danica soto states will be doing sx with him soon gave referral to come see us.)       Update PCP  Update Chief Complaint         History of Present Illness / Problem Focused Workflow     Jennifer Hudson presents to the clinic with Back Pain (Pt having lower back pain, pain started when back was fractured almost 2 months ago, Taking pain meds,C/O pain level 8. Seeing DR Danica soto states will be doing sx with him soon gave referral to come see us.)     This is an 89-year-old female who presents to clinic today for her initial consultation as a referral from Dr. Mishra.  She complains of low back pain that started after she fell about 2 months ago.  She suffered an L1 compression fracture and was referred here for kyphoplasty.  She denies any radiation into the lower extremities.      Back Pain        Review of Systems     Review of Systems   Musculoskeletal:  Positive for back pain.   All other systems reviewed and are negative.       Medical / Social / Family History     Past Medical History:   Diagnosis Date    Macular degeneration        Past Surgical History:   Procedure Laterality Date    ABDOMINAL SURGERY      ABDOMINOPLASTY      APPENDECTOMY      BLADDER SURGERY      CATARACT EXTRACTION W/  INTRAOCULAR LENS IMPLANT      LAPAROSCOPY      OOPHORECTOMY      REDUCTION OF BOTH BREASTS         Social History  Ms. Hudson  reports that she has never smoked. She has never used smokeless tobacco. She reports that she does not currently use alcohol. She reports that she does not use drugs.    Family  History  Ms.'s Tristan family history includes Hypertension in an other family member; No Known Problems in her father and mother.    Medications and Allergies     Medications  Outpatient Medications Marked as Taking for the 5/31/24 encounter (Office Visit) with Ilda Sapp MD   Medication Sig Dispense Refill    celecoxib (CELEBREX) 200 MG capsule Take 1 capsule (200 mg total) by mouth once daily. 60 capsule 1    gabapentin (NEURONTIN) 100 MG capsule Take 1 capsule (100 mg total) by mouth 2 (two) times daily as needed (pain). 30 capsule 1    ibandronate (BONIVA) 150 mg tablet Take 1 tablet (150 mg total) by mouth every 30 days. 3 tablet 3    multivit-min/iron/folic/shr066 (HAIR, SKIN AND NAILS ADVANCED ORAL) Take by mouth.      olopatadine (PATANOL) 0.1 % ophthalmic solution   See Instructions, INSTILL 1 DROP INTO EACH EYE TWICE DAILY FOR 30 DAYS, # 10 mL, 0 Refill(s), Pharmacy: [facility], 155, cm, Height/Length Dosing, 12/17/21 10:02:00 CST, 77, kg, Weight Dosing, 12/17/21 10:02:00 CST      pravastatin (PRAVACHOL) 40 MG tablet Take 1 tablet (40 mg total) by mouth every evening. 90 tablet 2    tolterodine (DETROL LA) 4 MG 24 hr capsule Take 1 capsule (4 mg total) by mouth once daily. 90 capsule 2    traZODone (DESYREL) 50 MG tablet Take 0.5 tablets (25 mg total) by mouth nightly as needed for Insomnia. 15 tablet 6    vit C/E/cuperic/zinc/lutein (PRESERVISION LUTEIN ORAL) Take by mouth.         Allergies  Review of patient's allergies indicates:   Allergen Reactions    Codeine        Physical Examination     Vitals:    05/31/24 0834   BP: 139/81   Pulse: 97     Pain Disability Index (PDI): 48       Spine Musculoskeletal Exam    Gait    Gait is normal.    General      Constitutional: appears stated age, well-developed and well-nourished    Scleral icterus: no    Labored breathing: no    Psychiatric: normal mood and affect and no acute distress    Neurological: alert and oriented x3    Skin: intact     Lymphadenopathy: none       Assessment and Plan (including Health Maintenance)      Problem List  Smart Sets  Document Outside HM   :    Plan:   Closed compression fracture of body of L1 vertebra  -     Ambulatory referral/consult to Pain Clinic  -     Ambulatory referral/consult to Neurosurgery; Future; Expected date: 06/07/2024       Referral to Dr. Salazar for kyphoplasty.    Problem List Items Addressed This Visit    None  Visit Diagnoses       Closed compression fracture of body of L1 vertebra        Relevant Orders    Ambulatory referral/consult to Neurosurgery              Future Appointments   Date Time Provider Department Center   6/3/2024  1:00 PM Raj[facility] De Leon[facility]    8/7/2024  2:30 PM [facility] [place]         There are no Patient Instructions on file for this visit.  No follow-ups on file.     Signature:  Ilda Sapp MD      Date of encounter: 5/31/24

## 2024-06-03 ENCOUNTER — OFFICE VISIT (OUTPATIENT)
Dept: INTERNAL MEDICINE | Facility: CLINIC | Age: 89
End: 2024-06-03
Payer: MEDICARE

## 2024-06-03 ENCOUNTER — LAB VISIT (OUTPATIENT)
Dept: LAB | Facility: HOSPITAL | Age: 89
End: 2024-06-03
Attending: NURSE PRACTITIONER
Payer: MEDICARE

## 2024-06-03 VITALS
BODY MASS INDEX: 33.98 KG/M2 | WEIGHT: 173.06 LBS | HEART RATE: 100 BPM | RESPIRATION RATE: 16 BRPM | TEMPERATURE: 98 F | DIASTOLIC BLOOD PRESSURE: 75 MMHG | SYSTOLIC BLOOD PRESSURE: 129 MMHG | OXYGEN SATURATION: 94 % | HEIGHT: 60 IN

## 2024-06-03 DIAGNOSIS — S32.010S CLOSED COMPRESSION FRACTURE OF L1 LUMBAR VERTEBRA, SEQUELA: ICD-10-CM

## 2024-06-03 DIAGNOSIS — N18.31 CHRONIC KIDNEY DISEASE, STAGE 3A: ICD-10-CM

## 2024-06-03 DIAGNOSIS — E66.9 OBESITY, UNSPECIFIED CLASSIFICATION, UNSPECIFIED OBESITY TYPE, UNSPECIFIED WHETHER SERIOUS COMORBIDITY PRESENT: ICD-10-CM

## 2024-06-03 DIAGNOSIS — E78.5 HYPERLIPIDEMIA, UNSPECIFIED HYPERLIPIDEMIA TYPE: ICD-10-CM

## 2024-06-03 DIAGNOSIS — R73.03 PREDIABETES: ICD-10-CM

## 2024-06-03 DIAGNOSIS — M80.00XD AGE-RELATED OSTEOPOROSIS WITH CURRENT PATHOLOGICAL FRACTURE WITH ROUTINE HEALING: ICD-10-CM

## 2024-06-03 DIAGNOSIS — E55.9 VITAMIN D DEFICIENCY: ICD-10-CM

## 2024-06-03 LAB
25(OH)D3+25(OH)D2 SERPL-MCNC: 58 NG/ML (ref 30–80)
ALBUMIN SERPL-MCNC: 3.8 G/DL (ref 3.4–4.8)
ALBUMIN/GLOB SERPL: 1.1 RATIO (ref 1.1–2)
ALP SERPL-CCNC: 87 UNIT/L (ref 40–150)
ALT SERPL-CCNC: 12 UNIT/L (ref 0–55)
ANION GAP SERPL CALC-SCNC: 11 MEQ/L
AST SERPL-CCNC: 17 UNIT/L (ref 5–34)
BASOPHILS # BLD AUTO: 0.07 X10(3)/MCL
BASOPHILS NFR BLD AUTO: 1.5 %
BILIRUB SERPL-MCNC: 0.6 MG/DL
BUN SERPL-MCNC: 10.9 MG/DL (ref 9.8–20.1)
CALCIUM SERPL-MCNC: 9.5 MG/DL (ref 8.4–10.2)
CHLORIDE SERPL-SCNC: 103 MMOL/L (ref 98–107)
CO2 SERPL-SCNC: 27 MMOL/L (ref 23–31)
CREAT SERPL-MCNC: 0.86 MG/DL (ref 0.55–1.02)
CREAT/UREA NIT SERPL: 13
EOSINOPHIL # BLD AUTO: 0.12 X10(3)/MCL (ref 0–0.9)
EOSINOPHIL NFR BLD AUTO: 2.5 %
ERYTHROCYTE [DISTWIDTH] IN BLOOD BY AUTOMATED COUNT: 15.1 % (ref 11.5–17)
GFR SERPLBLD CREATININE-BSD FMLA CKD-EPI: >60 ML/MIN/1.73/M2
GLOBULIN SER-MCNC: 3.6 GM/DL (ref 2.4–3.5)
GLUCOSE SERPL-MCNC: 112 MG/DL (ref 82–115)
HCT VFR BLD AUTO: 42.6 % (ref 37–47)
HGB BLD-MCNC: 13.8 G/DL (ref 12–16)
IMM GRANULOCYTES # BLD AUTO: 0.01 X10(3)/MCL (ref 0–0.04)
IMM GRANULOCYTES NFR BLD AUTO: 0.2 %
LYMPHOCYTES # BLD AUTO: 1.51 X10(3)/MCL (ref 0.6–4.6)
LYMPHOCYTES NFR BLD AUTO: 31.9 %
MCH RBC QN AUTO: 29.9 PG (ref 27–31)
MCHC RBC AUTO-ENTMCNC: 32.4 G/DL (ref 33–36)
MCV RBC AUTO: 92.2 FL (ref 80–94)
MONOCYTES # BLD AUTO: 0.77 X10(3)/MCL (ref 0.1–1.3)
MONOCYTES NFR BLD AUTO: 16.2 %
NEUTROPHILS # BLD AUTO: 2.26 X10(3)/MCL (ref 2.1–9.2)
NEUTROPHILS NFR BLD AUTO: 47.7 %
NRBC BLD AUTO-RTO: 0 %
PLATELET # BLD AUTO: 258 X10(3)/MCL (ref 130–400)
PMV BLD AUTO: 10.4 FL (ref 7.4–10.4)
POTASSIUM SERPL-SCNC: 4.3 MMOL/L (ref 3.5–5.1)
PROT SERPL-MCNC: 7.4 GM/DL (ref 5.8–7.6)
RBC # BLD AUTO: 4.62 X10(6)/MCL (ref 4.2–5.4)
SODIUM SERPL-SCNC: 141 MMOL/L (ref 136–145)
WBC # SPEC AUTO: 4.74 X10(3)/MCL (ref 4.5–11.5)

## 2024-06-03 PROCEDURE — 1100F PTFALLS ASSESS-DOCD GE2>/YR: CPT | Mod: CPTII,,, | Performed by: NURSE PRACTITIONER

## 2024-06-03 PROCEDURE — 80053 COMPREHEN METABOLIC PANEL: CPT

## 2024-06-03 PROCEDURE — 1159F MED LIST DOCD IN RCRD: CPT | Mod: CPTII,,, | Performed by: NURSE PRACTITIONER

## 2024-06-03 PROCEDURE — 85025 COMPLETE CBC W/AUTO DIFF WBC: CPT

## 2024-06-03 PROCEDURE — 1160F RVW MEDS BY RX/DR IN RCRD: CPT | Mod: CPTII,,, | Performed by: NURSE PRACTITIONER

## 2024-06-03 PROCEDURE — 3288F FALL RISK ASSESSMENT DOCD: CPT | Mod: CPTII,,, | Performed by: NURSE PRACTITIONER

## 2024-06-03 PROCEDURE — 99214 OFFICE O/P EST MOD 30 MIN: CPT | Mod: PBBFAC | Performed by: NURSE PRACTITIONER

## 2024-06-03 PROCEDURE — 36415 COLL VENOUS BLD VENIPUNCTURE: CPT

## 2024-06-03 PROCEDURE — 1125F AMNT PAIN NOTED PAIN PRSNT: CPT | Mod: CPTII,,, | Performed by: NURSE PRACTITIONER

## 2024-06-03 PROCEDURE — 99214 OFFICE O/P EST MOD 30 MIN: CPT | Mod: S$PBB,,, | Performed by: NURSE PRACTITIONER

## 2024-06-03 PROCEDURE — 82306 VITAMIN D 25 HYDROXY: CPT

## 2024-06-03 RX ORDER — GABAPENTIN 100 MG/1
100 CAPSULE ORAL 2 TIMES DAILY PRN
Qty: 60 CAPSULE | Refills: 2 | Status: SHIPPED | OUTPATIENT
Start: 2024-06-03 | End: 2025-06-03

## 2024-06-03 NOTE — PROGRESS NOTES
Internal Medicine Clinic  MICAELA Merritt     Patient Name: Jennifer Hudson   : 1935  MRN:[id number]     Chief Complaint     Chief Complaint   Patient presents with    Follow-up     Continues with lower back pain        History of Present Illness     89-year-old  female, presents in clinic with her daughter for f/u. Pt had a fall 24; getting down from a step ladder, lost her balance; she grabbed the oven door to prevent her fall and fell on her tailbone. Reports dahlia hip and tailbone pain. Pain is worse to the left hip. Denies pain in legs, weakness or numbness. Denies bowel or bladder loss. Vitals stable today. Walking unassisted today. She was seen by Dr. Danica CRAIG and referred to Dr. Matthew Salazar for Kyphoplasty. Pt was evaluated by PM Dr. Sapp but declines management with GERHARD. Takes gabapentin with relief and Dr. Mishra also started her on Celebrex. She has not started Celebrex. Pt is on Boniva but is open to starting Prolia injections. Will order today, Calcium and vit d in normal ranges.     Past medical history of hypertension, HLD, Aortic Stenosis, CKD, urinary incontinence, age-related macular degeneration, and osteoporosis. Followed by [facility], and Renal clinic. Renal clinic prescribes torsemide 10 mg daily for lower extremity edema, hold hctz/lisinopril.   Following [facility] CARDIO. DEXA results reviewed 2022; osteopenia.  Boniva once monthly continued. Denies chest pain, shortness of breath, cough, fever, headache, dizziness, weakness, abdominal pain, nausea, vomiting, diarrhea, constipation, dysuria, depression, anxiety.                  Review of Systems     Review of Systems   Constitutional: Negative.    HENT: Negative.     Eyes: Negative.    Respiratory: Negative.     Cardiovascular: Negative.    Gastrointestinal: Negative.    Endocrine: Negative.    Genitourinary: Negative.    Musculoskeletal:  Positive for back pain.   Integumentary:  Negative.   Allergic/Immunologic: Negative.     Neurological: Negative.    Hematological: Negative.    Psychiatric/Behavioral: Negative.     All other systems reviewed and are negative.       Physical Examination     Visit Vitals  /75 (BP Location: Left arm, Patient Position: Sitting, BP Method: Medium (Automatic))   Pulse 100   Temp 97.8 °F (36.6 °C) (Oral)   Resp 16   Ht 5' (1.524 m)   Wt 78.5 kg (173 lb 1 oz)   SpO2 (!) 94%   BMI 33.80 kg/m²        BP Readings from Last 6 Encounters:   06/03/24 129/75   05/31/24 139/81   04/18/24 121/80   10/12/23 131/82   04/18/23 109/73   04/03/23 (!) 140/70   ]    Wt Readings from Last 6 Encounters:   06/03/24 78.5 kg (173 lb 1 oz)   05/31/24 78.9 kg (174 lb)   05/30/24 79 kg (174 lb 3.2 oz)   04/18/24 78.1 kg (172 lb 2.9 oz)   10/12/23 77.1 kg (170 lb)   08/01/23 78.8 kg (173 lb 11.6 oz)   ]    BMI Readings from Last 3 Encounters:   05/31/24 33.98 kg/m²   05/30/24 34.02 kg/m²   04/18/24 33.63 kg/m²         Physical Exam  Vitals and nursing note reviewed.   Constitutional:       Appearance: Normal appearance.   HENT:      Head: Normocephalic and atraumatic.      Right Ear: Tympanic membrane, ear canal and external ear normal.      Left Ear: Tympanic membrane, ear canal and external ear normal.      Nose: Nose normal.      Mouth/Throat:      Mouth: Mucous membranes are moist.      Pharynx: Oropharynx is clear.   Eyes:      Extraocular Movements: Extraocular movements intact.      Conjunctiva/sclera: Conjunctivae normal.      Pupils: Pupils are equal, round, and reactive to light.   Cardiovascular:      Rate and Rhythm: Normal rate and regular rhythm.      Pulses: Normal pulses.      Heart sounds: Normal heart sounds.   Pulmonary:      Effort: Pulmonary effort is normal.      Breath sounds: Normal breath sounds.   Abdominal:      General: Abdomen is flat. Bowel sounds are normal.      Palpations: Abdomen is soft.   Musculoskeletal:         General: Tenderness present. Normal range of motion.      Cervical back: Normal  range of motion and neck supple.      Comments: Lumbosacral ttp   Skin:     General: Skin is warm and dry.      Capillary Refill: Capillary refill takes less than 2 seconds.   Neurological:      General: No focal deficit present.      Mental Status: She is alert and oriented to person, place, and time. Mental status is at baseline.   Psychiatric:         Mood and Affect: Mood normal.         Behavior: Behavior normal.         Thought Content: Thought content normal.         Judgment: Judgment normal.          Labs / Imaging     Chemistry:  Lab Results   Component Value Date     06/03/2024    K 4.3 06/03/2024    BUN 10.9 06/03/2024    CREATININE 0.86 06/03/2024    EGFRNORACEVR >60 06/03/2024    GLUCOSE 112 06/03/2024    CALCIUM 9.5 06/03/2024    ALKPHOS 87 06/03/2024    LABPROT 7.4 06/03/2024    ALBUMIN 3.8 06/03/2024    BILIDIR 0.2 07/01/2021    IBILI 0.50 07/01/2021    AST 17 06/03/2024    ALT 12 06/03/2024    EBCYBOZU41LC 58 06/03/2024        Lab Results   Component Value Date    HGBA1C 6.1 09/21/2020        Hematology:  Lab Results   Component Value Date    WBC 4.74 06/03/2024    RBC 4.62 06/03/2024    HGB 13.8 06/03/2024    HCT 42.6 06/03/2024    MCV 92.2 06/03/2024    MCH 29.9 06/03/2024    MCHC 32.4 (L) 06/03/2024    RDW 15.1 06/03/2024     06/03/2024    MPV 10.4 06/03/2024        Lipid Panel:  Lab Results   Component Value Date    CHOL 220 (H) 10/12/2023    HDL 59 10/12/2023    .00 10/12/2023    TRIG 174 (H) 10/12/2023    TOTALCHOLEST 4 10/12/2023        Urine:  Lab Results   Component Value Date    APPEARANCEUA Clear 03/02/2023    SGUA 1.027 03/02/2023    PROTEINUA Trace (A) 03/02/2023    KETONESUA Negative 03/02/2023    LEUKOCYTESUR 25 (A) 03/02/2023    RBCUA 6-10 (A) 03/02/2023    WBCUA 6-10 (A) 03/02/2023    BACTERIA None Seen 03/02/2023    SQEPUA Occ (A) 03/02/2023    HYALINECASTS None Seen 03/02/2023    CREATRANDUR 169.5 (H) 03/02/2023    PROTEINURINE 23.5 03/02/2023    UPROTCREA 0.1  03/02/2023          Assessment       ICD-10-CM ICD-9-CM   1. Closed compression fracture of L1 lumbar vertebra, sequela  S32.010S 905.1   2. Age-related osteoporosis with current pathological fracture with routine healing  M80.00XD V54.29     733.01   3. Obesity, unspecified classification, unspecified obesity type, unspecified whether serious comorbidity present  E66.9 278.00   4. Vitamin D deficiency  E55.9 268.9   5. Hyperlipidemia, unspecified hyperlipidemia type  E78.5 272.4   6. Prediabetes  R73.03 790.29        Plan   1. Closed compression fracture of L1 lumbar vertebra, sequela  RX gabapentin refilled.  May take tylenol arthritis prn  Fall precautions discussed.  She was seen by Dr. Danica CRAIG and referred to Dr. Matthew Salazar for Kyphoplasty. Pt was evaluated by PM Dr. Sapp but declines management with GERHARD. Takes gabapentin with relief and Dr. Mishra also started her on Celebrex. She has not started Celebrex. Pt is on Boniva but is open to starting Prolia injections. Will order today, Calcium and vit d in normal ranges.    2. Osteoporosis, unspecified osteoporosis type, unspecified pathological fracture presence  She was seen by Dr. Danica CRAIG and referred to Dr. Matthew Salazar for Kyphoplasty. Pt was evaluated by PM Dr. Sapp but declines management with GERHARD. Takes gabapentin with relief and Dr. Mishra also started her on Celebrex. She has not started Celebrex. Pt is on Boniva but is open to starting Prolia injections. Will order today, Calcium and vit d in normal ranges.    3. Obesity, unspecified classification, unspecified obesity type, unspecified whether serious comorbidity present  Goal BMI <30.  Exercise 5 times a week for 30 minutes per day.  Avoid soda, simple sugars, excessive rice, potatoes or bread. Limit fast foods and fried foods.  Choose complex carbs in moderation (example: green vegetables, beans, oatmeal). Eat plenty of fresh fruits and vegetables with lean meats daily.  Do not skip meals. Eat a  balanced portion size.  Avoid fad diets. Consider permanent healthy life style changes.       4. Vitamin D deficiency  Vitamin D level reviewed and is currently at goal, between 30-80 ng/mL. Continue OTC Vitamin D3 2000 IU daily.           Current Outpatient Medications   Medication Instructions    celecoxib (CELEBREX) 200 mg, Oral, Daily    gabapentin (NEURONTIN) 100 mg, Oral, 2 times daily PRN    ibandronate (BONIVA) 150 mg, Oral, Every 30 days    multivit-min/iron/folic/ryb989 (HAIR, SKIN AND NAILS ADVANCED ORAL) Oral    olopatadine (PATANOL) 0.1 % ophthalmic solution   See Instructions, INSTILL 1 DROP INTO EACH EYE TWICE DAILY FOR 30 DAYS, # 10 mL, 0 Refill(s), Pharmacy: [id number], 155, cm, Height/Length Dosing, 12/17/21 10:02:00 CST, 77, kg, Weight Dosing, 12/17/21 10:02:00 CST    pravastatin (PRAVACHOL) 40 mg, Oral, Nightly    tolterodine (DETROL LA) 4 mg, Oral, Daily    torsemide (DEMADEX) 10 mg, Oral, Daily, for 90 days    traZODone (DESYREL) 25 mg, Oral, Nightly PRN    UNABLE TO FIND 10 mg, Oral, Daily, Prevagen    vit C/E/cuperic/zinc/lutein (PRESERVISION LUTEIN ORAL) Oral       Orders Placed This Encounter   Procedures    CBC Auto Differential    Comprehensive Metabolic Panel    Lipid Panel    TSH    Hemoglobin A1C    Urinalysis    Vitamin D         Future Appointments   Date Time Provider Department Center   8/7/2024  2:30 PM [facility]    12/9/2024  1:40 PM Rajani De Leon FNP [facility]         Follow up in about 6 months (around 12/3/2024) for lab review.    Labs thoroughly reviewed with patient. Medication refills addressed today.  RTC prn and 6 months, with labs 1 week prior to the apt.  COVID 19 precautions given to patient.  Patient voices understanding of all discharge instructions.      MICAELA Merritt

## 2024-06-04 PROBLEM — M80.00XD AGE-RELATED OSTEOPOROSIS WITH CURRENT PATHOLOGICAL FRACTURE WITH ROUTINE HEALING: Status: ACTIVE | Noted: 2022-05-23

## 2024-06-09 ENCOUNTER — TELEPHONE (OUTPATIENT)
Dept: INTERNAL MEDICINE | Facility: CLINIC | Age: 89
End: 2024-06-09
Payer: MEDICARE

## 2024-06-09 NOTE — TELEPHONE ENCOUNTER
----- Message from Katherin Campbell LPN sent at 6/6/2024  1:51 PM CDT -----    ----- Message -----  From: Rocío Estrada  Sent: 6/6/2024   1:17 PM CDT  To: Moises FELDER Staff    .Type:  Patient Returning Call    Who Called:pt  Who Left Message for Patient:pt  Does the patient know what this is regarding?:referral  Would the patient rather a call back or a response via [facility]?   Best Call Back Number:[phone]  Additional Information: Please call back about referral and they do not take her insurance. Please send referral to [facility] they do take her insurance

## 2024-06-10 NOTE — TELEPHONE ENCOUNTER
Pain management ordered a referral to NEUROSURGERY DR. CASIE MCLAUGHLIN in [place], for her L1 compression fracture; for kyphoplasty. Please call their office and verify if they accepted her insurance [employer]'s health. If not please help pt and call insurance to see which local NS will accept her. Pt is 89 years old and I do not want to delay any longer. Also Let her know I ordered the prolia injections for osteoporosis. Please assist with scheduling. Thanks

## 2024-06-13 ENCOUNTER — TELEPHONE (OUTPATIENT)
Dept: INTERNAL MEDICINE | Facility: CLINIC | Age: 89
End: 2024-06-13
Payer: MEDICARE

## 2024-06-13 NOTE — TELEPHONE ENCOUNTER
See my message addressing this already on 6/9/24. Pain management ordered a referral to NEUROSURGERY DR. CASIE MCLAUGHLIN in [place], for her L1 compression fracture; for kyphoplasty. Please call their office and verify if they accepted her insurance [employer]'s health. If not please help pt and call insurance to see which local NS will accept her. Pt is 89 years old and I do not want to delay any longer. Also Let her know I ordered the prolia injections for osteoporosis. Please assist with scheduling. Thanks

## 2024-06-13 NOTE — TELEPHONE ENCOUNTER
----- Message from Mae Vega LPN sent at 6/13/2024  7:53 AM CDT -----  Regarding: referral    ----- Message -----  From: Deirdre Mei  Sent: 6/10/2024   1:33 PM CDT  To: Moises FELDER Staff  Subject: rad- appt                                        .Type:  Needs Medical Advice    Who Called: pt  June Tristan calling regarding General Inquiry for #Please call back about referral and they do not take her insurance. Please send referral to [facility] they do take her insurance  Would the patient rather a call back or a response via [facility]?   Best Call Back Number:  [phone]  Additional Information:

## 2024-06-13 NOTE — TELEPHONE ENCOUNTER
Called Neurosurgery DR. Matthew Salazar 's office . Confirmed referral was received and they do accept pt's insurance, however due to them not having pt's insurance information , they requested referral be sent again along with pt's insurance information . Tried contacting pain management  Ilda Sapp MD's office X3 , unable to speak with someone . Will try contacting them again to resend referral along with pt's insurance information.

## 2024-06-17 ENCOUNTER — TELEPHONE (OUTPATIENT)
Dept: INTERNAL MEDICINE | Facility: CLINIC | Age: 89
End: 2024-06-17
Payer: MEDICARE

## 2024-06-17 NOTE — TELEPHONE ENCOUNTER
Attempted to contact Dr Matthew Salazar with no answer,message left to contact MIKE thomas office concerning referral  Patient informed that provider ordered the prolia injections for osteoporosis,acknowledged understanding

## 2024-06-17 NOTE — TELEPHONE ENCOUNTER
Faxed pt's insurance information, referral, last office note, and last PT notes to Dr. Castro's office. Will scan into pt's chart once successful confirmation is received.

## 2024-06-17 NOTE — TELEPHONE ENCOUNTER
Attempted to contact Dr Matthew Salazar with no answer,message left to contact MIKE thomas office concerning referral

## 2024-06-19 ENCOUNTER — TELEPHONE (OUTPATIENT)
Dept: INTERNAL MEDICINE | Facility: CLINIC | Age: 89
End: 2024-06-19
Payer: MEDICARE

## 2024-06-19 NOTE — TELEPHONE ENCOUNTER
Spoke with Mary in infusion clinic ,informed me that order for Prolia infusion was received and will contact patient once her insurance responds.

## 2024-06-19 NOTE — TELEPHONE ENCOUNTER
Called Dr Malini Salazar's office . Spoke to Trip with the office's answering service. Left message to call [facility] to confirm pt's office notes, referral and Insurance versification was received so that pt can be scheduled to bee seen by their office. Stated message will be given to the office staff.

## 2024-06-23 NOTE — TELEPHONE ENCOUNTER
Please f/u on this case, was pt scheduled by Dr. Raygoza office? You may want to check with doctors office and the pt.  thx

## 2024-06-24 NOTE — TELEPHONE ENCOUNTER
Called Dr. Salazar's office and  stated they did received the referral but need her insurance information.  stated their office was closed last Wednesday when we previously tried to reach out to them. Re-faxing notes, insurance and referral. Will scan confirmation in chart upon receiving.

## 2024-06-27 ENCOUNTER — TELEPHONE (OUTPATIENT)
Dept: INTERNAL MEDICINE | Facility: CLINIC | Age: 89
End: 2024-06-27
Payer: MEDICARE

## 2024-06-27 DIAGNOSIS — M80.00XD AGE-RELATED OSTEOPOROSIS WITH CURRENT PATHOLOGICAL FRACTURE WITH ROUTINE HEALING: ICD-10-CM
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
## 2024-06-27 DIAGNOSIS — M54.50 ACUTE LOW BACK PAIN WITHOUT SCIATICA, UNSPECIFIED BACK PAIN LATERALITY: ICD-10-CM

## 2024-06-27 DIAGNOSIS — S32.010S CLOSED COMPRESSION FRACTURE OF L1 LUMBAR VERTEBRA, SEQUELA: Primary | ICD-10-CM

## 2024-06-27 DIAGNOSIS — M80.80XA OTHER OSTEOPOROSIS WITH CURRENT PATHOLOGICAL FRACTURE, INITIAL ENCOUNTER: ICD-10-CM

## 2024-06-27 NOTE — TELEPHONE ENCOUNTER
----- Message from Ana Arzola sent at 6/26/2024  2:59 PM CDT -----  Regarding: referral  Who Called: Jennifer Hudson    Caller is requesting assistance/information from provider's office.    Symptoms (please be specific):      How long has patient had these symptoms:      List of preferred pharmacies on file (remove unneeded): [unfilled]  If different, enter pharmacy into here including location and phone number:         Preferred Method of Contact: Phone Call  Patient's Preferred Phone Number on File: [phone]   Best Call Back Number, if different:  Additional Information: Pt is calling to check on the status of the referral; please advise.

## 2024-06-27 NOTE — TELEPHONE ENCOUNTER
Called Dr. Salazar's office to check on status of referral. Was notified referral received but now they're stating they need some sort of imaging like MRI or CT. Notified them that pt had XR done back in April and they stated again they need an MRI or CT. Notified them that we have trying to send this referral since 6/17/24 if not sooner and was never once informed by their office we needed images of any kind. Informed them the pt has been waiting and now reaching out to us. Please advise.

## 2024-07-01 ENCOUNTER — TELEPHONE (OUTPATIENT)
Dept: INTERNAL MEDICINE | Facility: CLINIC | Age: 89
End: 2024-07-01

## 2024-07-01 NOTE — TELEPHONE ENCOUNTER
----- Message from Carolyn Connolly sent at 7/1/2024 11:23 AM CDT -----  Regarding: referral  Nhi swanson/ Dr.Jason Salazar's office states they need the referral to be sent again along with the pt's insurance info and radiology report. Any further questions pls call [phone] fax # [phone]

## 2024-07-15 ENCOUNTER — HOSPITAL ENCOUNTER (OUTPATIENT)
Dept: RADIOLOGY | Facility: HOSPITAL | Age: 89
Discharge: HOME | End: 2024-07-15
Attending: NURSE PRACTITIONER
Payer: MEDICARE

## 2024-07-15 DIAGNOSIS — S32.010S CLOSED COMPRESSION FRACTURE OF L1 LUMBAR VERTEBRA, SEQUELA: ICD-10-CM
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
## 2024-07-15 PROCEDURE — 72148 MRI LUMBAR SPINE W/O DYE: CPT | Mod: TC

## 2024-07-16 ENCOUNTER — TELEPHONE (OUTPATIENT)
Dept: INTERNAL MEDICINE | Facility: CLINIC | Age: 89
End: 2024-07-16
Payer: MEDICARE

## 2024-07-16 NOTE — TELEPHONE ENCOUNTER
Referral Information    Referral # Creation Date Referral Status Status Update    [id number] 06/04/2024 Denied 06/21/2024: Status History     Status Reason Referral Type Referral Reasons Referral Class   Not a Covered Benefit Medication Authorization none Internal     To Specialty To Provider To Location/Place of Service To Department   Chemotherapy none none [facility] CHEMOTHERAPY INFUSION     To Vendor Referred By By Location/Place of Service By Department   none Rajani De Leon, MICAELA [facility] INTERNAL [facility]     Priority Start Date Expiration Date Referral Entered By   Routine 06/04/2024 06/04/2025 Rajani De Leon, MICAELA     Visits Requested Visits Authorized Visits Completed Visits Scheduled   1 0       Primary Coverage    Payer Plan Sponsor Code Group Number Group Name   [employer] [facility] SECURE SNP        Primary Subscriber    Subscriber ID Subscriber Name Subscriber N Subscriber Address   [id number] [facility]  [address] [address]     Secondary Coverage    Payer Plan Sponsor Code Group Number Group Name   MEDICA[place]        Secondary Subscriber    Subscriber ID Subscriber Name Subscriber N Subscriber Address   [id number] Edwards,JUNE  [address] [address]     Procedure Information    Service Details  Procedure Modifiers Provider Requested Approved   none  0 0    - VA DENOSUMAB INJ, 1 MG none  1 0   06274 (CPT®) - [place] INJECTION,THERAP/PROPH/UMQN9OE, IM OR SUBCUT none  1 0     Scheduling    None     Diagnosis Information    Diagnosis   M80.00XD (ICD-10-CM) - Age-related osteoporosis with current pathological fracture with routine healing     Referral Notes  Number of Notes: 4  .  Type Date User Summary Attachment   RTF Letter 06/21/2024  2:51 PM Angelika De León Auto: [id number] REFERRAL DENIAL LETTER -   Note:  Dr. Rajani De Leon,     Thank you for ordering  - VA  DENOSUMAB INJ, 1 MG  28087 (CPT®) - [place] INJECTION,THERAP/PROPH/XWIS4HH, IM OR SUBCUT for patient Jennifer Hudson, MRN [id number]. Unfortunately, the patient's insurance, [facility], has denied the service due to Not a Covered Benefit, N/A. This is an automated In Basket notification that does not require a reply. For a more detailed explanation, or for questions regarding this insurance denial, send an In Basket message to the Pre Service Intake pool or call the [facility] Pre-Service department at [phone] and reference referral ID [id number].The Pre-Service department hours are M-F 8 a.m. to 5 p.m.       Thank you,     [facility]

## 2024-07-18 NOTE — TELEPHONE ENCOUNTER
Spoke with Ms Schmitt on Oncology infusion unit she informed to try ordering Evenity treatment plan which be approved.Route referral location to [facility]

## 2024-07-22 ENCOUNTER — TELEPHONE (OUTPATIENT)
Dept: INTERNAL MEDICINE | Facility: CLINIC | Age: 89
End: 2024-07-22
Payer: MEDICARE

## 2024-07-22 RX ORDER — SODIUM CHLORIDE 0.9 % (FLUSH) 0.9 %
10 SYRINGE (ML) INJECTION
OUTPATIENT
Start: 2024-07-22

## 2024-07-22 RX ORDER — ZOLEDRONIC ACID 5 MG/100ML
5 INJECTION, SOLUTION INTRAVENOUS
OUTPATIENT
Start: 2024-07-22

## 2024-07-22 RX ORDER — HEPARIN 100 UNIT/ML
500 SYRINGE INTRAVENOUS
OUTPATIENT
Start: 2024-07-22

## 2024-07-22 NOTE — TELEPHONE ENCOUNTER
Please inform pt that I have ordered Evenity infusion for treatment of osteoporosis. This is a once a month infusion in our infusion clinic upstairs. This is to replace her current treatment Boniva.  Also has pt received an apt date and time with DR. Matthew Salazar's office for her back? We should have already faxed the MRI and referral to his office. Thanks

## 2024-07-22 NOTE — TELEPHONE ENCOUNTER
Called Dr. Salazar's office and was informed pt doesn't have an appt because the referral was denied twice.  couldn't tell me why it was denied and stated the person who handles that wouldn't be back in office until Friday.    Called pt and relayed PCP's message regarding infusions. Pt stated he can't do once a month due to transportation and stated she was told she could had a choice to do infusions 1x month for 12 months or once every 6 months. Pt stated she prefer 1x every 6 months. Please advise.

## 2024-07-22 NOTE — TELEPHONE ENCOUNTER
Prolia was denied by insurance. I will send Therapy plan for Reclast which is once a year. I left a message at Dr. Raygoza office, because they told me that she needed a MRI lumbar before they would accept her and I ordered the MRI and now they are telling you that they will not accept her. I am aware their person who handles referrals is out til Friday, Please f/u on this for me.

## 2024-07-23 NOTE — TELEPHONE ENCOUNTER
Spoke with Dr Salazar's and  informed that I refaxed referral notes,insurance info and MRI results waiting on approval Office informed me that the person that handles referrals  will be back on Friday,I informed that our clinc will call on Friday for status on referral  Provider ROM De Leon did speak to Ms Hudson on 7/22/24 informing her she is sending order for Therapy plan for Reclast which is once a year. Patient acknowledged understanding

## 2024-07-25 ENCOUNTER — TELEPHONE (OUTPATIENT)
Dept: INTERNAL MEDICINE | Facility: CLINIC | Age: 89
End: 2024-07-25
Payer: MEDICARE

## 2024-07-25 NOTE — TELEPHONE ENCOUNTER
----- Message from Ana Arzola sent at 7/24/2024 12:44 PM CDT -----  Who Called: Jennifer Hudson    Caller is requesting assistance/information from provider's office.    Symptoms (please be specific):      How long has patient had these symptoms:      List of preferred pharmacies on file (remove unneeded): [unfilled]  If different, enter pharmacy into here including location and phone number:         Preferred Method of Contact: Phone Call  Patient's Preferred Phone Number on File: [phone]   Best Call Back Number, if different:  Additional Information: Pt would like to know when she first reported hurting her back; please advise.

## 2024-07-29 ENCOUNTER — TELEPHONE (OUTPATIENT)
Dept: INTERNAL MEDICINE | Facility: CLINIC | Age: 89
End: 2024-07-29
Payer: MEDICARE

## 2024-07-29 DIAGNOSIS — M80.00XD AGE-RELATED OSTEOPOROSIS WITH CURRENT PATHOLOGICAL FRACTURE WITH ROUTINE HEALING: ICD-10-CM

## 2024-07-29 DIAGNOSIS — S32.010S CLOSED COMPRESSION FRACTURE OF L1 LUMBAR VERTEBRA, SEQUELA: ICD-10-CM

## 2024-07-29 NOTE — TELEPHONE ENCOUNTER
Spoke with Dr Matute office and confirmed that he does do Kyphoplasty procedure Please send referral to [facility]  Phone #[phone],fax # [phone].Fax should include order,diagnosis code reason for procedure. Insurance information,MRI results,clinic notes and recent labs.  Sharon Brownlee

## 2024-07-29 NOTE — TELEPHONE ENCOUNTER
----- Message from Farooq Burdick MD sent at 2024 10:42 AM CDT -----  Regarding: RE: Order for GRAYSON  Good Morning,    This referral was routed to Interventional Radiology at [facility] in [place]. If you would like the patient treated in [place], please reach out to the team there.    Farooq Burdick MD  Department of Radiology  ----- Message -----  From: Rajani Graves FNP  Sent: 2024   8:36 AM CDT  To: [facility] Neuro Procedure Request Pool  Subject: Order for GRAYSON                                Patient Name: GRAYSON([id number])  Sex: Female  : 1935      PCP: RAJANI GRAVES    Center: None     Types of orders made on 2024: Outpatient Referral    Order Date:2024  Ordering User:RAJANI GRAVES [[id number]]  Encounter Provider:Rajani Graves FNP [[id number]]  Authorizing Provider: Rajani Graves FNP [[id number]]  Supervising Provider:JANIE SAL [[id number]]  Type of Supervision:Collaborating Physician  Department:[facility] INTERNAL MEDICINE[[id number]]    Order Specific Information  Order: Ambulatory referral/consult to Interventional RAD [Custom: [id number]]           Order #: [id number]: 1 FUTURE    Priority: Routine  Class: External Referral    Resulting Agency: [facility]    Bald[facility]  [employer] Order Information      Expires on:2025            Expected by:2024                   Comment:Refer to Dr. Dwayne Matute             Spoke with Dr Matute office and confirmed that he does do Kyphoplasty              procedure Please send referral to Marcio             Phone #[phone],fax # [phone].Fax should include              order,diagnosis code reason for procedure. Insurance information,MRI                results,clinic notes and recent labs.             Attention Torrie    Associated Diagnoses      S32.010S Closed compression fracture of L1 lumbar vertebra, sequela      M80.00XD Age-related osteoporosis with current pathological fracture with       routine healing       Type of consult: -> IR Neuro         Procedure Requested: -> Vertebroplasty/Kyphoplasty         Spinal level? -> L1 and L5         Indication for procedure? -> hx of osteoporosi  s (starting Reclast).                                 previously on boniva. possible L1 kyphoplasty         Pertinent imaging studies? -> XRAY and MRI lumbar         Patient taking meds (hold 5 days) -> Pt not on Blood thinners         Contrast allergy: -> No           Priority: Routine  Class: External Referral    Resulting Agency: [employer]    Future Order Information      Expires on:08/29/2025          Z1     Expected by:07/29/2024                   Comment:Refer to Dr. Dwayne Matute             Spoke with Dr Matute office and confirmed that he does do Kyphoplasty              procedure Please send referral to Marcio             Phone #[phone],fax # [phone].Fax should include              order,diagnosis code reason for procedure. Insurance information,MRI              results,clinic notes and recent labs.             Attention Jaimes  my    Associated Diagnoses      S32.010S Closed compression fracture of L1 lumbar vertebra, sequela      M80.00XD Age-related osteoporosis with current pathological fracture with       routine healing      Type of consult: -> IR Neuro         Procedure Requested: -> Vertebroplasty/Kyphoplasty         Spinal level? -> L1 and L5         Indication for procedure? -> hx of osteoporosis (starting Reclast).                                 previously on   boniva. possible L1 kyphoplasty         Pertinent imaging studies? -> XRAY and MRI lumbar         Patient taking meds (hold 5 days) -> Pt not on Blood thinners         Contrast allergy: -> No

## 2024-07-29 NOTE — TELEPHONE ENCOUNTER
----- Message from Omar Sheriff sent at 7/22/2024  4:27 PM CDT -----  .Who Called: Jennifer Hudson    Caller is requesting assistance/information from provider's office.    Symptoms (please be specific): n/a   How long has patient had these symptoms:  n/a  List of preferred pharmacies on file (remove unneeded): [unfilled]  If different, enter pharmacy into here including location and phone number: n/a      Preferred Method of Contact: Phone Call  Patient's Preferred Phone Number on File: [phone]   Best Call Back Number, if different:  Additional Information:  pt called wanting to speak with Rajani De Leon about information discussed  Dr. Rushing does due the surgery that the pt needs and accepts insurance

## 2024-08-01 ENCOUNTER — DOCUMENTATION ONLY (OUTPATIENT)
Dept: INFUSION THERAPY | Facility: HOSPITAL | Age: 89
End: 2024-08-01
Payer: MEDICARE

## 2024-08-01 DIAGNOSIS — M80.00XD AGE-RELATED OSTEOPOROSIS WITH CURRENT PATHOLOGICAL FRACTURE WITH ROUTINE HEALING: Primary | ICD-10-CM

## 2024-08-02 ENCOUNTER — DOCUMENTATION ONLY (OUTPATIENT)
Dept: INFUSION THERAPY | Facility: HOSPITAL | Age: 89
End: 2024-08-02
Payer: MEDICARE

## 2024-08-02 NOTE — PROGRESS NOTES
1142 Spoke with patient on phone due to CMP machine being down results can be delayed. She asked to be rescheduled for labs /Relcast on 8/7/24 @12noon. Appts were confirmed labs @11am & 12noon.

## 2024-08-07 ENCOUNTER — INFUSION (OUTPATIENT)
Dept: INFUSION THERAPY | Facility: HOSPITAL | Age: 89
End: 2024-08-07
Attending: INTERNAL MEDICINE
Payer: MEDICARE

## 2024-08-07 VITALS
DIASTOLIC BLOOD PRESSURE: 89 MMHG | OXYGEN SATURATION: 95 % | HEIGHT: 60 IN | WEIGHT: 173.69 LBS | BODY MASS INDEX: 34.1 KG/M2 | HEART RATE: 97 BPM | TEMPERATURE: 98 F | SYSTOLIC BLOOD PRESSURE: 172 MMHG | RESPIRATION RATE: 20 BRPM

## 2024-08-07 DIAGNOSIS — M80.00XD AGE-RELATED OSTEOPOROSIS WITH CURRENT PATHOLOGICAL FRACTURE WITH ROUTINE HEALING: Primary | ICD-10-CM

## 2024-08-07 PROCEDURE — 96365 THER/PROPH/DIAG IV INF INIT: CPT

## 2024-08-07 PROCEDURE — 63600175 PHARM REV CODE 636 W HCPCS: Performed by: NURSE PRACTITIONER

## 2024-08-07 PROCEDURE — 25000003 PHARM REV CODE 250: Performed by: NURSE PRACTITIONER

## 2024-08-07 RX ORDER — SODIUM CHLORIDE 0.9 % (FLUSH) 0.9 %
10 SYRINGE (ML) INJECTION
Status: DISCONTINUED | OUTPATIENT
Start: 2024-08-07 | End: 2024-08-07 | Stop reason: HOSPADM

## 2024-08-07 RX ORDER — HEPARIN 100 UNIT/ML
500 SYRINGE INTRAVENOUS
OUTPATIENT
Start: 2024-08-07

## 2024-08-07 RX ORDER — ZOLEDRONIC ACID 5 MG/100ML
5 INJECTION, SOLUTION INTRAVENOUS
OUTPATIENT
Start: 2024-08-07

## 2024-08-07 RX ORDER — SODIUM CHLORIDE 0.9 % (FLUSH) 0.9 %
10 SYRINGE (ML) INJECTION
OUTPATIENT
Start: 2024-08-07

## 2024-08-07 RX ORDER — ZOLEDRONIC ACID 5 MG/100ML
5 INJECTION, SOLUTION INTRAVENOUS
Status: COMPLETED | OUTPATIENT
Start: 2024-08-07 | End: 2024-08-07

## 2024-08-07 RX ADMIN — ZOLEDRONIC ACID 5 MG: 5 INJECTION, SOLUTION INTRAVENOUS at 12:08

## 2024-08-07 RX ADMIN — SODIUM CHLORIDE: 9 INJECTION, SOLUTION INTRAVENOUS at 12:08

## 2024-08-08 ENCOUNTER — TELEPHONE (OUTPATIENT)
Dept: INTERNAL MEDICINE | Facility: CLINIC | Age: 89
End: 2024-08-08
Payer: MEDICARE

## 2024-08-09 ENCOUNTER — TELEPHONE (OUTPATIENT)
Dept: INTERNAL MEDICINE | Facility: CLINIC | Age: 89
End: 2024-08-09
Payer: MEDICARE

## 2024-08-09 NOTE — TELEPHONE ENCOUNTER
Spoke to pt and Abigail with pt's insurance([employer]) , with both on the line. Abigail stated order written for pt's Denosumab was written for 1mg with code , but the injections comes in a prefilled syringe of 60 mg or 120  mg (Prolia).  Requesting clarification on if the provider wants the pt to receive the a 60 mg or 120 mg and for PCP's preferred order be sent . For any questions Abigail provided insurance's number [phone]. Pt stated order was written by PCP and stated she would like to be contacted or have I[facility] contact her insurance for any questions in regards and once it is resolved.  Pt stated she was informed she needs PA for her medication , but Sera stated medication needs PA .     Called pt 's insurance and was connected to [facility] . Spoke with Stevie whom stated PA wasn't started for medication .     Received message from staff Kasey . Stated she spoke to Abigail with pt's insurance company . Says Abigail informed her to inform me to disregard message he had given above , and stated all medications will be covered( Prolia. Generic brand )

## 2024-08-11 NOTE — TELEPHONE ENCOUNTER
I had already switched her infusion to reclast because prolia was not approved and pt received infusion on 8/7/24 upstairs. Thanks

## 2024-08-12 ENCOUNTER — TELEPHONE (OUTPATIENT)
Dept: INTERNAL MEDICINE | Facility: CLINIC | Age: 89
End: 2024-08-12
Payer: MEDICARE

## 2024-08-12 DIAGNOSIS — M80.00XD AGE-RELATED OSTEOPOROSIS WITH CURRENT PATHOLOGICAL FRACTURE WITH ROUTINE HEALING: ICD-10-CM

## 2024-08-12 DIAGNOSIS — S32.010S CLOSED COMPRESSION FRACTURE OF L1 LUMBAR VERTEBRA, SEQUELA: Primary | ICD-10-CM

## 2024-08-12 NOTE — TELEPHONE ENCOUNTER
----- Message from MICAELA Merritt sent at 8/12/2024 11:27 AM CDT -----    ----- Message -----  From: Miriam Hathaway MA  Sent: 8/7/2024   2:32 PM CDT  To: MICAELA Merritt    Please advise.  ----- Message -----  From: Omar Sheriff  Sent: 8/7/2024   2:17 PM CDT  To: Moises FELDER Staff    .Who Called: Jennifer Hudson    Caller is requesting assistance/information from provider's office.    Symptoms (please be specific): n/a   How long has patient had these symptoms:  n/a  List of preferred pharmacies on file (remove unneeded): [unfilled]  If different, enter pharmacy into here including location and phone number: n/a      Preferred Method of Contact: Phone Call  Patient's Preferred Phone Number on File: [phone]   Best Call Back Number, if different:  Additional Information:  pt called wanting to speak with nurse about a compliance form and insurance authorization that is needed by radiology group Dr. Smith, Pt asked to speak with Ginna

## 2024-08-12 NOTE — TELEPHONE ENCOUNTER
Good afternoon,   Pt is very anxious regarding this finding and would she appreciate a consult. Thank you for your quick response.

## 2024-08-12 NOTE — TELEPHONE ENCOUNTER
----- Message from Ginna Lowe LPN sent at 8/12/2024  3:33 PM CDT -----  Regarding: FW: referral for Interventional radiology    ----- Message -----  From: Bailey Collier  Sent: 8/12/2024  12:32 PM CDT  To: Moises FELEDR Staff  Subject: referral for Interventional radiology            Per Torrie @ [facility] Dr Michele Raymond whom now works for [facility] is doing Interventional Radiology.  I will call Torrie back once we get it all settled to do with him.  She will hold on to the referral until she hears back from me.

## 2024-08-12 NOTE — TELEPHONE ENCOUNTER
Spoke with patient explained to her that procedure approval is needed from insurance to proceed but Ms Gerard is unable to request approval due to facility not within [facility] system,new referral was sent to Dr Meredith Gandhi  office for procedure.Informed patient will contact Dr Gandhi office for conformation and will contact her,acknowledged understanding

## 2024-08-12 NOTE — TELEPHONE ENCOUNTER
----- Message from Gómez Malcolm MD sent at 2024  4:28 PM CDT -----  Regarding: RE: Order for GRAYSON  It looks like the patient was referred to pain medicine a few months back, and they referred her to Dr. Salazar, who seems to be a [facility] neurosurgeon, so I do not have records of whether that visit occurred.  I'm happy to see her in clinic.  The fracture may have happened when she fell in April, but there is not clear residual edema.  GHANSHYAM  ----- Message -----  From: Gómez Malcolm MD  Sent: 2024   4:20 PM CDT  To: MICAELA Merritt; Miley Fair PA-C; #  Subject: RE: Order for GRAYSON                          The L1 compression fracture looks chronic, so I am not sure if she would benefit from treatment.  We can discuss with her in clinic, if you wish.  I am not sure when this fracture occurred.    GHANSHYAM  ----- Message -----  From: Rajani Graves FNP  Sent: 2024   3:56 PM CDT  To: [facility] Neuro Procedure Request Pool  Subject: Order for GRAYSON                                Patient Name: MARJAN GALICIA([id number])  Sex: Female  : 1935      PCP: RAJANI GRAVES    Center: None     Types of orders made on 2024: Outpatient Referral    Order Date:2024  Ordering User:RAJANI GRAVES [[id number]]  Encounter Provider:Rajani Graves FNP [[id number]]  Authorizing Provider: Rajani Graves FNP [[id number]]  Supervising Provider:JANIE SAL [[id number]]  Type of Supervision:Collaborating Physician  Department:[facility] INTERNAL MEDICINE[[id number]]    Order Specific Information  Order: Ambulatory referral/consult to Interventional RAD [Custom: [id number]]           Order #: [id number]: 1 FUTURE    Priority: Routine  Class: Internal Referral    Resulting Agency: [employer]    Baldemar doherty Order Information      Expires on:2025            Expected by:2024                   Associated Diagnoses      S32.010S Closed compression fracture of L1 lumbar vertebra, sequela       M80.00XD Age-related osteoporosis with current pathological fracture with       routine healing      Type of consult: -> IR Neuro         Procedure Requested: -> Vertebroplasty/Kyphoplasty         Spinal level? -> L1 and L5         Indica  tion for procedure? -> hx of osteoporosis (on Reclast).previously on                                 boniva. possible L1 kyphoplasty         Pertinent imaging studies? -> xray and MRI         Patient taking meds (hold 5 days) -> Pt not on Blood thinners         Contrast allergy: -> No           Priority: Routine  Class: Internal Referral    Resulting Agency: [employer]    Future Order Information      Expires on  :09/12/2025            Expected by:08/12/2024                   Associated Diagnoses      S32.010S Closed compression fracture of L1 lumbar vertebra, sequela      M80.00XD Age-related osteoporosis with current pathological fracture with       routine healing      Type of consult: -> IR Neuro         Procedure Requested: -> Vertebroplasty/Kyphoplasty         Spinal level? -> L1 and L5         Indication for procedure? -> hx of osteoporosis   (on Reclast).previously on                                 boniva. possible L1 kyphoplasty         Pertinent imaging studies? -> xray and MRI         Patient taking meds (hold 5 days) -> Pt not on Blood thinners         Contrast allergy: -> No

## 2024-08-13 NOTE — TELEPHONE ENCOUNTER
Patient informed that she should expect a call from Dr Gómez Malcolm's office (interventional radiology) to schedule an appt   for evaluation of her back pain since her fall. Acknowledged understanding.

## 2024-08-13 NOTE — TELEPHONE ENCOUNTER
Please let pt know; Pt should expect a call from Dr Gómez Malcolm's office (interventional radiology) to schedule an apt for evaluation of her back pain since her fall.

## 2024-08-14 ENCOUNTER — TELEPHONE (OUTPATIENT)
Dept: INTERNAL MEDICINE | Facility: CLINIC | Age: 89
End: 2024-08-14
Payer: MEDICARE

## 2024-08-14 DIAGNOSIS — M80.00XD AGE-RELATED OSTEOPOROSIS WITH CURRENT PATHOLOGICAL FRACTURE WITH ROUTINE HEALING: ICD-10-CM

## 2024-08-14 DIAGNOSIS — S32.010S CLOSED COMPRESSION FRACTURE OF L1 LUMBAR VERTEBRA, SEQUELA: Primary | ICD-10-CM

## 2024-08-14 NOTE — TELEPHONE ENCOUNTER
Patient called today stating there is no Dr Bates listed she was attempting to call an schedule appt,after a thorough search it was found that referral routed to a Dr Malcolm in the fax pool,call was made to radiology dept in search of Dr Michele Gandhi who is located here at [facility] upon locating provider Leida patient'sPCP was informed to put in an order for IR so dept will see request  and follow through,patient notified of correction and order was put in for IR

## 2024-08-15 ENCOUNTER — TELEPHONE (OUTPATIENT)
Dept: INTERVENTIONAL RADIOLOGY/VASCULAR | Facility: HOSPITAL | Age: 89
End: 2024-08-15

## 2024-08-15 NOTE — TELEPHONE ENCOUNTER
IR Received referral for a kyphoplasty and IR sent it to IR . He states nothing to kypho. I sent message to NP and Dr. Conley will also call Rajani De Leon NP.

## 2024-08-16 ENCOUNTER — TELEPHONE (OUTPATIENT)
Dept: INTERNAL MEDICINE | Facility: CLINIC | Age: 89
End: 2024-08-16

## 2024-08-16 ENCOUNTER — TELEPHONE (OUTPATIENT)
Dept: INTERNAL MEDICINE | Facility: CLINIC | Age: 89
End: 2024-08-16
Payer: MEDICARE

## 2024-08-16 NOTE — TELEPHONE ENCOUNTER
Called and spoke to Andrew Ramos MA at Dr. Malcolm's office. Andrew stated pt would need to be set up for a virtual visit for a consultation on 8/26/24 at 11am, pt just needs to have portal set up. Andrew stated she has spot held for in person on 8/26/24 but once pt gets set up on portal to contact Andrew and she can change to VV for consultation for same day and time. Was informed the procedure can get done but pt needs to have a consultation first then go from there.

## 2024-08-16 NOTE — TELEPHONE ENCOUNTER
Per IM message. I called pt and she stated she is waiting to hear back from Dr. Malcolm. Stated she was suppose to have a Zoom meeting with him today and if she does not hear anything from him by Wednesday of this upcoming week she would like us to f/u with her and stated she would be interested in trying the injections.

## 2024-08-16 NOTE — TELEPHONE ENCOUNTER
Called pt and gave Dr. Malcolm's number, [phone], to pt. Pt verbalized understanding with no further questions or concerns at this time.

## 2024-08-22 NOTE — TELEPHONE ENCOUNTER
Spoke with patient informed her that she will need to set up patient portal for appt on 8/26/24 at 11:00 am with Dr Malcolm  for consultation,patient states neighbor will assist with setting up with patient portal for appt.Please reach out to patient for follow up if was successful in setting up portal,thanks

## 2024-08-23 ENCOUNTER — PATIENT MESSAGE (OUTPATIENT)
Dept: INTERNAL MEDICINE | Facility: CLINIC | Age: 89
End: 2024-08-23
Payer: MEDICARE

## 2024-08-26 ENCOUNTER — OFFICE VISIT (OUTPATIENT)
Dept: INTERVENTIONAL RADIOLOGY/VASCULAR | Facility: CLINIC | Age: 89
End: 2024-08-26
Payer: MEDICARE

## 2024-08-26 DIAGNOSIS — M48.061 SPINAL STENOSIS AT L4-L5 LEVEL: Primary | ICD-10-CM

## 2024-08-26 DIAGNOSIS — S32.010S CLOSED COMPRESSION FRACTURE OF L1 LUMBAR VERTEBRA, SEQUELA: ICD-10-CM

## 2024-08-26 DIAGNOSIS — M80.00XD AGE-RELATED OSTEOPOROSIS WITH CURRENT PATHOLOGICAL FRACTURE WITH ROUTINE HEALING: ICD-10-CM

## 2024-08-26 PROCEDURE — 99203 OFFICE O/P NEW LOW 30 MIN: CPT | Mod: 95,,,

## 2024-08-26 NOTE — PROGRESS NOTES
Subjective     Patient ID: Jennifer Hudson is a 89 y.o. female.    Chief Complaint: Back Pain    89 yo female presents virtually with non radiating lower mid and low back pain.Low back worse than mid back. She reports she fell several months ago and it has been having back pain since. She tripped over a stool. She did seek medical care a couple days after. She reports her pain level is 8/10. She is unable to lay flat. Has to now sleep in a recliner. Was previously active going on walks during the day; however, do to pain she is mostly in a recliner. Celebrex and bending forward improves her pain some. Takes gabapentin sparingly.     Will schedule as a walk in procedure.   Not taking any blood thinners.       Review of Systems   Constitutional:  Positive for activity change (due to pain). Negative for appetite change, fatigue and unexpected weight change.   Respiratory:  Negative for cough and shortness of breath.    Cardiovascular:  Negative for chest pain and leg swelling.   Gastrointestinal:  Negative for abdominal pain.   Musculoskeletal:  Positive for arthralgias and back pain.   Neurological:  Negative for facial asymmetry, speech difficulty and coordination difficulties.   Psychiatric/Behavioral:  Positive for sleep disturbance (due to pain). Negative for behavioral problems and confusion.           Objective     Physical Exam  Constitutional:       General: She is not in acute distress.     Appearance: Normal appearance.      Comments: Virtual Visit.    HENT:      Head: Normocephalic and atraumatic.      Nose: Nose normal.   Eyes:      Conjunctiva/sclera: Conjunctivae normal.   Pulmonary:      Effort: Pulmonary effort is normal.   Musculoskeletal:         General: Tenderness (lower back pain) present.      Comments: Pain relief with forward flexion (Positive shopping cart sign)    Neurological:      General: No focal deficit present.      Mental Status: She is alert.   Psychiatric:         Mood and Affect: Mood  normal.         Behavior: Behavior normal.     Impression:     Chronic compression fractures of L1 and L5 vertebral bodies as detailed above.  No acute fracture.     Multilevel degenerative changes with pertinent findings as follows:     L4-L5 severe central canal stenosis and moderate left foraminal stenosis.     L5-S1 moderate left foraminal stenosis/contact of the exiting left L5 nerve.     L3-L4 moderate central canal stenosis and moderate right foraminal stenosis/contact of the exiting right L3 nerve.     L2-3 moderate central canal stenosis.        Electronically signed by:Patrick Gray  Date:                                            07/16/2024  Time:                                           10:23       Assessment and Plan     1. Spinal stenosis at L4-L5 level  -     IR GERHARD Lumbar; Future; Expected date: 08/26/2024    2. Closed compression fracture of L1 lumbar vertebra, sequela  -     Ambulatory referral/consult to Interventional RAD  -     Ambulatory referral/consult to Interventional Radiology    3. Age-related osteoporosis with current pathological fracture with routine healing  -     Ambulatory referral/consult to Interventional RAD  -     Ambulatory referral/consult to Interventional Radiology    - Will plan for a bilateral L4-5 GERHARD.   - Discussed how the procedure will be performed. She agrees to proceed.    The patient location is: [place]  The chief complaint leading to consultation is: back pain    Visit type: audiovisual    30 minutes of total time spent on the encounter, which includes face to face time and non-face to face time preparing to see the patient (eg, review of tests), Obtaining and/or reviewing separately obtained history, Documenting clinical information in the electronic or other health record, Independently interpreting results (not separately reported) and communicating results to the patient/family/caregiver, or Care coordination (not separately reported).     Each patient to  whom he or she provides medical services by telemedicine is:  (1) informed of the relationship between the physician and patient and the respective role of any other health care provider with respect to management of the patient; and (2) notified that he or she may decline to receive medical services by telemedicine and may withdraw from such care at any time.    Miley Fair PA-C  Interventional Radiology  [phone]

## 2024-09-03 ENCOUNTER — HOSPITAL ENCOUNTER (OUTPATIENT)
Dept: INTERVENTIONAL RADIOLOGY/VASCULAR | Facility: HOSPITAL | Age: 89
Discharge: HOME | End: 2024-09-03
Payer: MEDICARE

## 2024-09-03 VITALS
SYSTOLIC BLOOD PRESSURE: 159 MMHG | DIASTOLIC BLOOD PRESSURE: 81 MMHG | OXYGEN SATURATION: 100 % | RESPIRATION RATE: 18 BRPM | HEART RATE: 75 BPM

## 2024-09-03 DIAGNOSIS — M48.061 SPINAL STENOSIS AT L4-L5 LEVEL: ICD-10-CM

## 2024-09-03 PROCEDURE — 25500020 PHARM REV CODE 255: Performed by: RADIOLOGY

## 2024-09-03 PROCEDURE — 63600175 PHARM REV CODE 636 W HCPCS: Performed by: STUDENT IN AN ORGANIZED HEALTH CARE EDUCATION/TRAINING PROGRAM

## 2024-09-03 PROCEDURE — 25000003 PHARM REV CODE 250: Performed by: STUDENT IN AN ORGANIZED HEALTH CARE EDUCATION/TRAINING PROGRAM

## 2024-09-03 RX ORDER — METHYLPREDNISOLONE ACETATE 40 MG/ML
INJECTION, SUSPENSION INTRA-ARTICULAR; INTRALESIONAL; INTRAMUSCULAR; SOFT TISSUE
Status: COMPLETED | OUTPATIENT
Start: 2024-09-03 | End: 2024-09-03

## 2024-09-03 RX ORDER — LIDOCAINE HYDROCHLORIDE 10 MG/ML
INJECTION, SOLUTION INFILTRATION; PERINEURAL
Status: COMPLETED | OUTPATIENT
Start: 2024-09-03 | End: 2024-09-03

## 2024-09-03 RX ADMIN — METHYLPREDNISOLONE ACETATE 40 MG: 40 INJECTION, SUSPENSION INTRA-ARTICULAR; INTRALESIONAL; INTRAMUSCULAR; SOFT TISSUE at 02:09

## 2024-09-03 RX ADMIN — IOHEXOL 5 ML: 180 INJECTION INTRAVENOUS at 02:09

## 2024-09-03 RX ADMIN — LIDOCAINE HYDROCHLORIDE 2 ML: 10 INJECTION, SOLUTION INFILTRATION; PERINEURAL at 02:09

## 2024-09-03 NOTE — H&P
Interventional Neuroradiology Pre-Procedure Note    Chief Complaint: Back pain    History of Present Illness:  Jennifer Hudson is a 89 y.o. female with non radiating lower mid and low back pain (8/10) who MRI L spine demonstrated severe central canal stenosis and moderate left foraminal stenosis at L4-L5 levels presents for TFESI.      Past Medical History:   Diagnosis Date    Macular degeneration      Past Surgical History:   Procedure Laterality Date    ABDOMINAL SURGERY  1958    ABDOMINOPLASTY  1974    APPENDECTOMY  1943    BLADDER SURGERY      CATARACT EXTRACTION W/  INTRAOCULAR LENS IMPLANT      LAPAROSCOPY      OOPHORECTOMY      REDUCTION OF BOTH BREASTS  1974       Home Meds:   Prior to Admission medications    Medication Sig Start Date End Date Taking? Authorizing Provider   celecoxib (CELEBREX) 200 MG capsule Take 1 capsule (200 mg total) by mouth once daily.  Patient not taking: Reported on 6/3/2024 5/30/24   Aayush Mishra MD   gabapentin (NEURONTIN) 100 MG capsule Take 1 capsule (100 mg total) by mouth 2 (two) times daily as needed (pain). 6/3/24 6/3/25  Rajani De Leon, KAYCEEP   ibandronate (BONIVA) 150 mg tablet Take 1 tablet (150 mg total) by mouth every 30 days.  Patient not taking: Reported on 6/3/2024 10/12/23   Rajani De Leon, KAYCEEP   multivit-min/iron/folic/dka086 (HAIR, SKIN AND NAILS ADVANCED ORAL) Take by mouth. 7/1/21   Provider, Historical   olopatadine (PATANOL) 0.1 % ophthalmic solution   See Instructions, INSTILL 1 DROP INTO EACH EYE TWICE DAILY FOR 30 DAYS, # 10 mL, 0 Refill(s), Pharmacy: [id number], 155, cm, Height/Length Dosing, 12/17/21 10:02:00 CST, 77, kg, Weight Dosing, 12/17/21 10:02:00 CST 1/31/22   Provider, Historical   pravastatin (PRAVACHOL) 40 MG tablet Take 1 tablet (40 mg total) by mouth every evening. 10/12/23 10/11/24  Rajani De Leon, KAYCEEP   tolterodine (DETROL LA) 4 MG 24 hr capsule Take 1 capsule (4 mg total) by mouth once daily. 10/12/23   Rajani De Leon,  "FNP   torsemide (DEMADEX) 10 MG Tab Take 1 tablet (10 mg total) by mouth once daily. for 90 days 3/2/23 4/18/24  Rhonda Bey FNP   traZODone (DESYREL) 50 MG tablet Take 0.5 tablets (25 mg total) by mouth nightly as needed for Insomnia. 10/27/23 10/26/24  Rajani De Leon FNP   UNABLE TO FIND Take 10 mg by mouth once daily. Prevagen    Provider, Historical   vit C/E/cuperic/zinc/lutein (PRESERVISION LUTEIN ORAL) Take by mouth.    Provider, Historical     Anticoagulants/Antiplatelets: no anticoagulation    Allergies:   Review of patient's allergies indicates:   Allergen Reactions    Codeine      Sedation History:  no adverse reactions    Review of Systems:   Hematological: negative  no known coagulopathies  Respiratory: no cough, shortness of breath, or wheezing  no shortness of breath  Cardiovascular: no chest pain or dyspnea on exertion  no chest pain  Gastrointestinal: no abdominal pain, change in bowel habits, or black or bloody stools  no abdominal pain  Genito-Urinary: no dysuria, trouble voiding, or hematuria  no dysuria  Musculoskeletal: +back pain  Neurological: no TIA or stroke symptoms         OBJECTIVE:     Vital Signs (Most Recent)       Physical Exam:  ASA: 2  Mallampati: 2    General: no acute distress  Mental Status: alert and oriented to person, place and time  HEENT: normocephalic, atraumatic  Chest: unlabored breathing  Heart: regular heart rate  Abdomen: nondistended  Extremity: moves all extremities    Laboratory  No results found for: "INR", "PT", "PTT"    Lab Results   Component Value Date    WBC 4.74 06/03/2024    HGB 13.8 06/03/2024    HCT 42.6 06/03/2024    MCV 92.2 06/03/2024     06/03/2024      Lab Results   Component Value Date     08/07/2024    K 5.2 (H) 08/07/2024     08/07/2024    CO2 25 08/07/2024    BUN 12.3 08/07/2024    CREATININE 0.77 08/07/2024    CALCIUM 9.5 08/07/2024    ALT 11 08/07/2024    AST 16 08/07/2024    ALBUMIN 3.8 08/07/2024    BILITOT 0.6 " 08/07/2024    BILIDIR 0.2 07/01/2021       ASSESSMENT/PLAN:   -Sedation Plan: Up to moderate  -Patient will undergo: fluoroscopy guided transforaminal epidural steroid & lidocaine injection at bilateral L4-L5 levels                  Rajesh Jurado MD, MHA  Fellow, NeuroEndovascular Surgery, [facility]  Neurologist, [facility] [place], [place]

## 2024-09-03 NOTE — DISCHARGE INSTRUCTIONS
Please call with any questions or concerns.      Monday thru Friday 8:00 am - 4:30 pm    Interventional Radiology   [phone]    After Hours    Ask for the Radiology Resident on call  [phone]

## 2024-09-03 NOTE — PLAN OF CARE
L4-L5 GERHARD procedure completed. Patient tolerated well. Patient AAOx3, no distress noted, respirations even and unlabored, full movement of BLE/BUE assessed. VSS. Lumbar procedure site x2 clean, dry, and intact; no bleeding or hematoma noted. Pt assisted from table to wheelchair and escorted to 2nd floor garage for rise home with family. Pt stable for discharge.

## 2024-09-04 ENCOUNTER — TELEPHONE (OUTPATIENT)
Dept: INTERNAL MEDICINE | Facility: CLINIC | Age: 89
End: 2024-09-04
Payer: MEDICARE

## 2024-09-04 NOTE — TELEPHONE ENCOUNTER
Called pt and informed her we could not mail the results due to a HIPAA violation and she can either access them via the portal or come in and pick them up. Pt stated she'll come in tomorrow and pick results up.

## 2024-09-04 NOTE — TELEPHONE ENCOUNTER
----- Message from Ana Arzola sent at 9/4/2024  1:08 PM CDT -----  Regarding: labs  Who Called: Jennifer Hudson    Caller is requesting assistance/information from provider's office.    Symptoms (please be specific):      How long has patient had these symptoms:      List of preferred pharmacies on file (remove unneeded): [unfilled]  If different, enter pharmacy into here including location and phone number:         Preferred Method of Contact: Phone Call  Patient's Preferred Phone Number on File: [phone]   Best Call Back Number, if different:  Additional Information: Pt is requesting that her most recent labs/blood work results, including her A1C levels be mailed out to her home address asap; pt will be seeing a weight loss specialist and she will need this info; please advise.

## 2024-09-06 NOTE — PROCEDURES
Radiology Post-Procedure Note    Pre Op Diagnosis: LBP    Post Op Diagnosis: Same    Procedure: Lumbar Transforaminal GERHARD    Procedure performed by: Gómez Malcolm MD    Written Informed Consent Obtained: Yes    Specimen Removed: NO    Estimated Blood Loss: Minimal      Level injected: L4-L5  Needle used: 22 gauge  Dose:  80 mg Depo-methylprednisolone   4 mL Lidocaine 1% MPF    Patient tolerated procedure well.    Gómez Malcolm MD  Attending Radiologist  Interventional Neuroradiology

## 2024-10-01 ENCOUNTER — OFFICE VISIT (OUTPATIENT)
Dept: OPHTHALMOLOGY | Facility: CLINIC | Age: 89
End: 2024-10-01
Payer: MEDICARE

## 2024-10-01 VITALS — HEIGHT: 60 IN | WEIGHT: 173.75 LBS | BODY MASS INDEX: 34.11 KG/M2

## 2024-10-01 DIAGNOSIS — H35.3190 AGE-RELATED MACULAR DEGENERATION WITH CENTRAL GEOGRAPHIC ATROPHY: ICD-10-CM

## 2024-10-01 DIAGNOSIS — H35.3133 ADVANCED ATROPHIC NONEXUDATIVE AGE-RELATED MACULAR DEGENERATION OF BOTH EYES WITHOUT SUBFOVEAL INVOLVEMENT: ICD-10-CM

## 2024-10-01 DIAGNOSIS — H35.30 AGE-RELATED MACULAR DEGENERATION: Primary | ICD-10-CM

## 2024-10-01 PROCEDURE — 92134 CPTRZ OPH DX IMG PST SGM RTA: CPT | Mod: PBBFAC,PN | Performed by: STUDENT IN AN ORGANIZED HEALTH CARE EDUCATION/TRAINING PROGRAM

## 2024-10-01 PROCEDURE — 99213 OFFICE O/P EST LOW 20 MIN: CPT | Mod: PBBFAC,PN | Performed by: STUDENT IN AN ORGANIZED HEALTH CARE EDUCATION/TRAINING PROGRAM

## 2024-10-01 PROCEDURE — 92134 CPTRZ OPH DX IMG PST SGM RTA: CPT | Mod: PBBFAC,PN | Performed by: OPHTHALMOLOGY

## 2024-10-01 NOTE — PROGRESS NOTES
HPI    6 months DFE and OCT mac  Last edited by Francheska Ortega MA on 10/1/2024  2:54 PM.            Assessment /Plan     For exam results, see Encounter Report.    Age-related macular degeneration    Age-related macular degeneration with central geographic atrophy                 OCTmac 8/1/23  OD: RPE attenuation, disrupted ellipsoid layer with underlying hyperreflectivity of choroid, trace PEDs without fluid  OS: RPE attenuation, disrupted ellipsoid layer with underlying hyperreflectivity of choroid, few PEDs without fluid  STABLE geographic atrophy    OCTmac 10/1/24:    RPE attenuation, disrupted ellipsoid layer with underlying hyperreflectivity of choroid, trace PEDs without fluid ou--> stable subfoveal involving GA ou     ========================    1. Atrophic hole OS  - Located at 12 o'clock, previously documented  - stable, no subretinal fluid, asymptomatic  - Seen with [facility]lem 6/2021  - stable 10/1/24, cont to follow, rd precautions    2. Non-exudative Severe AMD OU  - AREDS2, no smoking  - Previously referred to low vision services   - stable dfe/optos/octm ou 10/1/24: with stable subfoveal involving GA ou. Stable vision ou   - pt interested in GA medications. Discussed that likely not candidate given subofveal involving. But will refer to retina dr today for discussion. PT can follow with them or us as wanted    3. PSK OU  - s/p CE/IOL OD (12/2013) and OS (2/2014)  - stable    4. Blepharitis/MGD OU  - cont WC's, AT's, and LS     5. ERM OS  - CTM    6. Asteroid hyalosis OS  - Monitor    RTC: 1 year dfe/octm/optos ou

## 2024-12-09 ENCOUNTER — OFFICE VISIT (OUTPATIENT)
Dept: INTERNAL MEDICINE | Facility: CLINIC | Age: 89
End: 2024-12-09
Payer: MEDICARE

## 2024-12-09 VITALS
BODY MASS INDEX: 34.75 KG/M2 | WEIGHT: 177 LBS | HEART RATE: 99 BPM | HEIGHT: 60 IN | DIASTOLIC BLOOD PRESSURE: 76 MMHG | SYSTOLIC BLOOD PRESSURE: 121 MMHG | TEMPERATURE: 98 F | RESPIRATION RATE: 16 BRPM

## 2024-12-09 DIAGNOSIS — E78.5 HYPERLIPIDEMIA, UNSPECIFIED HYPERLIPIDEMIA TYPE: ICD-10-CM

## 2024-12-09 DIAGNOSIS — M81.0 OSTEOPOROSIS, UNSPECIFIED OSTEOPOROSIS TYPE, UNSPECIFIED PATHOLOGICAL FRACTURE PRESENCE: ICD-10-CM

## 2024-12-09 DIAGNOSIS — M48.061 SPINAL STENOSIS AT L4-L5 LEVEL: ICD-10-CM

## 2024-12-09 DIAGNOSIS — R73.03 PREDIABETES: ICD-10-CM

## 2024-12-09 DIAGNOSIS — E55.9 VITAMIN D DEFICIENCY: ICD-10-CM

## 2024-12-09 DIAGNOSIS — M80.80XA OTHER OSTEOPOROSIS WITH CURRENT PATHOLOGICAL FRACTURE, INITIAL ENCOUNTER: ICD-10-CM

## 2024-12-09 DIAGNOSIS — S32.010A CLOSED COMPRESSION FRACTURE OF BODY OF L1 VERTEBRA: ICD-10-CM

## 2024-12-09 PROCEDURE — 1100F PTFALLS ASSESS-DOCD GE2>/YR: CPT | Mod: CPTII,,, | Performed by: NURSE PRACTITIONER

## 2024-12-09 PROCEDURE — 99214 OFFICE O/P EST MOD 30 MIN: CPT | Mod: PBBFAC | Performed by: NURSE PRACTITIONER

## 2024-12-09 PROCEDURE — 3288F FALL RISK ASSESSMENT DOCD: CPT | Mod: CPTII,,, | Performed by: NURSE PRACTITIONER

## 2024-12-09 PROCEDURE — 99214 OFFICE O/P EST MOD 30 MIN: CPT | Mod: S$PBB,,, | Performed by: NURSE PRACTITIONER

## 2024-12-09 PROCEDURE — 1159F MED LIST DOCD IN RCRD: CPT | Mod: CPTII,,, | Performed by: NURSE PRACTITIONER

## 2024-12-09 PROCEDURE — 1126F AMNT PAIN NOTED NONE PRSNT: CPT | Mod: CPTII,,, | Performed by: NURSE PRACTITIONER

## 2024-12-09 RX ORDER — CELECOXIB 200 MG/1
200 CAPSULE ORAL DAILY
Qty: 90 CAPSULE | Refills: 1 | Status: SHIPPED | OUTPATIENT
Start: 2024-12-09

## 2024-12-09 RX ORDER — GABAPENTIN 100 MG/1
100 CAPSULE ORAL 2 TIMES DAILY PRN
Qty: 60 CAPSULE | Refills: 2 | Status: SHIPPED | OUTPATIENT
Start: 2024-12-09 | End: 2025-12-09

## 2024-12-09 RX ORDER — PRAVASTATIN SODIUM 40 MG/1
40 TABLET ORAL NIGHTLY
Qty: 90 TABLET | Refills: 1 | Status: SHIPPED | OUTPATIENT
Start: 2024-12-09 | End: 2025-12-09

## 2024-12-09 RX ORDER — TOLTERODINE 4 MG/1
4 CAPSULE, EXTENDED RELEASE ORAL DAILY
Qty: 90 CAPSULE | Refills: 1 | Status: SHIPPED | OUTPATIENT
Start: 2024-12-09

## 2024-12-09 RX ORDER — TRAZODONE HYDROCHLORIDE 50 MG/1
25 TABLET ORAL NIGHTLY PRN
Qty: 15 TABLET | Refills: 6 | Status: SHIPPED | OUTPATIENT
Start: 2024-12-09 | End: 2025-12-09

## 2024-12-09 NOTE — PROGRESS NOTES
[facility]  [facility]     Patient Name: Jennifer Hudson   : 1935  MRN:[id number]     Chief Complaint     Chief Complaint   Patient presents with    Osteoporosis     Did not do labs        History of Present Illness     89-year-old  female, presents in clinic with her daughter for f/u. S/p bilateral L4-L5 transforaminal epidural steroid injection 9/3/24 per Dr. Malcolm. Reclast infusion on 2024.     Pt had a fall 24; getting down from a step ladder, lost her balance; she grabbed the oven door to prevent her fall and fell on her tailbone. Reports dahlia hip and tailbone pain with report of right sciatica. Denies bowel or bladder loss. Vitals stable today. Walking unassisted today. She was seen by Dr. Danica CRAIG and started on Celebrex, needs refills. Pt was evaluated by PM Dr. Sapp but declined management with GERHARD at the time. She is now requesting to f/u with Dr. Sapp moving forward for injections. Takes gabapentin with mild relief.      Past medical history of hypertension, HLD, Aortic Stenosis, CKD, urinary incontinence, age-related macular degeneration, insomnia, and osteoporosis. Followed by [facility]. Renal clinic prescribes torsemide 10 mg daily for lower extremity edema, hold hctz/lisinopril.   Following [facility]. DEXA results reviewed 2022; osteopenia.  Boniva once monthly continued. Denies chest pain, shortness of breath, cough, fever, headache, dizziness, weakness, abdominal pain, nausea, vomiting, diarrhea, constipation, dysuria, depression, anxiety.               Review of Systems     Review of Systems   Constitutional: Negative.    HENT: Negative.     Eyes: Negative.    Respiratory: Negative.     Cardiovascular: Negative.    Gastrointestinal: Negative.    Endocrine: Negative.    Genitourinary: Negative.    Musculoskeletal:  Positive for back pain.   Integumentary:  Negative.   Allergic/Immunologic: Negative.    Neurological: Negative.     Hematological: Negative.    Psychiatric/Behavioral: Negative.     All other systems reviewed and are negative.       Physical Examination     Visit Vitals  /76 (BP Location: Left arm, Patient Position: Sitting)   Pulse 99   Temp 97.9 °F (36.6 °C) (Oral)   Resp 16   Ht 5' (1.524 m)   Wt 80.3 kg (177 lb)   BMI 34.57 kg/m²        BP Readings from Last 6 Encounters:   12/09/24 121/76   09/03/24 (!) 159/81   08/07/24 (!) 172/89   06/03/24 129/75   05/31/24 139/81   04/18/24 121/80   ]    Wt Readings from Last 6 Encounters:   12/09/24 80.3 kg (177 lb)   10/01/24 78.8 kg (173 lb 11.6 oz)   08/07/24 78.8 kg (173 lb 11.2 oz)   06/03/24 78.5 kg (173 lb 1 oz)   05/31/24 78.9 kg (174 lb)   05/30/24 79 kg (174 lb 3.2 oz)   ]    BMI Readings from Last 3 Encounters:   12/09/24 34.57 kg/m²   10/01/24 33.93 kg/m²   08/07/24 33.92 kg/m²         Physical Exam  Vitals and nursing note reviewed.   Constitutional:       Appearance: Normal appearance.   HENT:      Head: Normocephalic and atraumatic.      Right Ear: Tympanic membrane, ear canal and external ear normal.      Left Ear: Tympanic membrane, ear canal and external ear normal.      Nose: Nose normal.      Mouth/Throat:      Mouth: Mucous membranes are moist.      Pharynx: Oropharynx is clear.   Eyes:      Extraocular Movements: Extraocular movements intact.      Conjunctiva/sclera: Conjunctivae normal.      Pupils: Pupils are equal, round, and reactive to light.   Cardiovascular:      Rate and Rhythm: Normal rate and regular rhythm.      Pulses: Normal pulses.      Heart sounds: Normal heart sounds.   Pulmonary:      Effort: Pulmonary effort is normal.      Breath sounds: Normal breath sounds.   Abdominal:      General: Abdomen is flat. Bowel sounds are normal.      Palpations: Abdomen is soft.   Musculoskeletal:         General: Tenderness present. Normal range of motion.      Cervical back: Normal range of motion and neck supple.      Comments: Lspine   Skin:      General: Skin is warm and dry.      Capillary Refill: Capillary refill takes less than 2 seconds.   Neurological:      General: No focal deficit present.      Mental Status: She is alert and oriented to person, place, and time. Mental status is at baseline.   Psychiatric:         Mood and Affect: Mood normal.         Behavior: Behavior normal.         Thought Content: Thought content normal.         Judgment: Judgment normal.          Labs / Imaging     Chemistry:  Lab Results   Component Value Date     08/07/2024    K 5.2 (H) 08/07/2024    BUN 12.3 08/07/2024    CREATININE 0.77 08/07/2024    EGFRNORACEVR >60 08/07/2024    GLUCOSE 115 08/07/2024    CALCIUM 9.5 08/07/2024    ALKPHOS 81 08/07/2024    LABPROT 7.3 08/07/2024    ALBUMIN 3.8 08/07/2024    BILIDIR 0.2 07/01/2021    IBILI 0.50 07/01/2021    AST 16 08/07/2024    ALT 11 08/07/2024    MFYCZUTI53OR 58 06/03/2024        Lab Results   Component Value Date    HGBA1C 6.1 09/21/2020        Hematology:  Lab Results   Component Value Date    WBC 4.74 06/03/2024    RBC 4.62 06/03/2024    HGB 13.8 06/03/2024    HCT 42.6 06/03/2024    MCV 92.2 06/03/2024    MCH 29.9 06/03/2024    MCHC 32.4 (L) 06/03/2024    RDW 15.1 06/03/2024     06/03/2024    MPV 10.4 06/03/2024        Lipid Panel:  Lab Results   Component Value Date    CHOL 220 (H) 10/12/2023    HDL 59 10/12/2023    .00 10/12/2023    TRIG 174 (H) 10/12/2023    TOTALCHOLEST 4 10/12/2023        Urine:  Lab Results   Component Value Date    APPEARANCEUA Clear 03/02/2023    SGUA 1.027 03/02/2023    PROTEINUA Trace (A) 03/02/2023    KETONESUA Negative 03/02/2023    LEUKOCYTESUR 25 (A) 03/02/2023    RBCUA 6-10 (A) 03/02/2023    WBCUA 6-10 (A) 03/02/2023    BACTERIA None Seen 03/02/2023    SQEPUA Occ (A) 03/02/2023    HYALINECASTS None Seen 03/02/2023    CREATRANDUR 169.5 (H) 03/02/2023    PROTEINURINE 23.5 03/02/2023    UPROTCREA 0.1 03/02/2023          Assessment       ICD-10-CM ICD-9-CM   1.  Osteoporosis, unspecified osteoporosis type, unspecified pathological fracture presence  M81.0 733.00   2. Vitamin D deficiency  E55.9 268.9   3. Prediabetes  R73.03 790.29   4. Other osteoporosis with current pathological fracture, initial encounter  M80.80XA 733.10     733.00   5. Closed compression fracture of L1 lumbar vertebra, sequela  S32.010S 905.1   6. Closed compression fracture of body of L1 vertebra  S32.010A 805.4   7. Hyperlipidemia, unspecified hyperlipidemia type  E78.5 272.4   8. BMI 34.0-34.9,adult  Z68.34 V85.34        Plan     1. Osteoporosis, unspecified osteoporosis type, unspecified pathological fracture presence  Reclast annually  DEXA ordered    2. Vitamin D deficiency  Vitamin D level reviewed and is currently at goal, between 30-80 ng/mL. Continue OTC Vitamin D3 2000 IU daily.     3. Prediabetes  Lab Results   Component Value Date    HGBA1C 6.1 09/21/2020     Prediabetes is reversible. Close follow up is important.  Maintain healthy weight or lose weight.   Eat fewer refined carbohydrates and fats, and more fiber.  Read nutrition labels.  Reduce portion sizes.  Eat out less often. Avoid fast foods.  Drink water and unsweetened beverages.  Spending at least one hour every day in physical activity.   - Urinalysis, Reflex to Urine Culture; Future      4. & 5. Closed compression fracture of L1 lumbar vertebra, sequela  Spinal stenosis L4-L5  Gabapentin and celebrex refilled  Refer to PM Dr. Aguilar as pt requested.    Lower back stretches daily.  Avoid strenuous lifting, use proper body mechanics.  Heating pad, Ice pack, Biofreeze or Epsom salt baths as needed.  Exercise to strengthen core muscles to support your back.  Consider physical therapy (PT)   - celecoxib (CELEBREX) 200 MG capsule; Take 1 capsule (200 mg total) by mouth once daily.  Dispense: 90 capsule; Refill: 1    6. Hyperlipidemia, unspecified hyperlipidemia type  Lab Results   Component Value Date    .00 10/12/2023     CHOL 220 (H) 10/12/2023    HDL 59 10/12/2023    TRIG 174 (H) 10/12/2023       Cont RX daily; refills given. Take Omega 3 daily.   Stressed importance of dietary modifications. Follow a low cholesterol, low saturated fat diet with less that 200mg of cholesterol a day.  Avoid fried foods and high saturated fats (high saturated fats less than 7% of calories).  Add Flax Seed/Fish Oil supplements to diet. Increase dietary fiber.  Regular exercise can reduce LDL and raise HDL. Stressed importance of physical activity 5 times per week for 30 minutes per day.    - pravastatin (PRAVACHOL) 40 MG tablet; Take 1 tablet (40 mg total) by mouth every evening.  Dispense: 90 tablet; Refill: 1         Current Outpatient Medications   Medication Instructions    celecoxib (CELEBREX) 200 mg, Oral, Daily    gabapentin (NEURONTIN) 100 mg, Oral, 2 times daily PRN    multivit-min/iron/folic/ngd882 (HAIR, SKIN AND NAILS ADVANCED ORAL) Take by mouth.    pravastatin (PRAVACHOL) 40 mg, Oral, Nightly    tolterodine (DETROL LA) 4 mg, Oral, Daily    torsemide (DEMADEX) 10 mg, Oral, Daily, for 90 days    traZODone (DESYREL) 25 mg, Oral, Nightly PRN    UNABLE TO FIND 10 mg, Daily    vit C/E/cuperic/zinc/lutein (PRESERVISION LUTEIN ORAL) Take by mouth.       Orders Placed This Encounter   Procedures    Urinalysis, Reflex to Urine Culture         Future Appointments   Date Time Provider Department Center   3/25/2025  2:00 PM Rajani De Leon[facility]   8/4/2025 12:00 PM INFUSION ROOM 53, [facility] CHEMOTHERAPY INFUSION [facility] CHEMO [facility]   10/1/2025  2:30 PM [facility]        Follow up in about 4 weeks (around 1/6/2025) for fasting labs.    Labs thoroughly reviewed with patient. Medication refills addressed today.  RTC prn and 4 wks, with labs 1 week prior to the apt.  COVID 19 precautions given to patient.  Patient voices understanding of all discharge instructions.      Rajani De Leon  FNP

## 2025-02-28 DIAGNOSIS — M48.061 STENOSIS, SPINAL, LUMBAR: ICD-10-CM

## 2025-02-28 DIAGNOSIS — S32.010A COMPRESSION FRACTURE OF L1 LUMBAR VERTEBRA: Primary | ICD-10-CM

## 2025-06-06 ENCOUNTER — TELEPHONE (OUTPATIENT)
Dept: INTERNAL MEDICINE | Facility: CLINIC | Age: OVER 89
End: 2025-06-06
Payer: MEDICARE

## 2025-07-17 ENCOUNTER — OFFICE VISIT (OUTPATIENT)
Facility: CLINIC | Age: OVER 89
End: 2025-07-17
Payer: MEDICARE

## 2025-07-17 VITALS
HEIGHT: 60 IN | SYSTOLIC BLOOD PRESSURE: 128 MMHG | HEART RATE: 102 BPM | DIASTOLIC BLOOD PRESSURE: 63 MMHG | BODY MASS INDEX: 33.77 KG/M2 | WEIGHT: 172 LBS

## 2025-07-17 DIAGNOSIS — M54.16 LUMBAR RADICULOPATHY: Primary | ICD-10-CM

## 2025-07-17 DIAGNOSIS — M48.061 LUMBAR FORAMINAL STENOSIS: ICD-10-CM

## 2025-07-17 DIAGNOSIS — M54.16 LUMBAR NERVE ROOT IMPINGEMENT: ICD-10-CM

## 2025-07-17 DIAGNOSIS — M54.16 LUMBAR NERVE ROOT COMPRESSION: ICD-10-CM

## 2025-07-17 DIAGNOSIS — M48.061 STENOSIS, SPINAL, LUMBAR: ICD-10-CM

## 2025-07-17 DIAGNOSIS — M51.26 LUMBAR DISC DISPLACEMENT WITHOUT MYELOPATHY: ICD-10-CM

## 2025-07-17 DIAGNOSIS — S32.010A COMPRESSION FRACTURE OF L1 LUMBAR VERTEBRA: ICD-10-CM

## 2025-07-17 DIAGNOSIS — Z87.81 HISTORY OF COMPRESSION FRACTURE OF SPINE: ICD-10-CM

## 2025-07-17 NOTE — PROGRESS NOTES
[facility]    Subjective:     Chief Complaint: Back Pain (Lumbar pain, compression fx L1 vertabra &  spinal stenosis  C/O pain level 8, Taking pain meds)    Referred by: Rajani De Leon FNP     History of Present Illness: Jennifer Hudson is a 89 y.o. female presents today as a follow-up for pain associated with lumbar foraminal stenosis, chronic compression fracture and lumbar radiculopathy.  She is accompanied by a friend and reports she has pain located to her back and right buttock.  This pain has been present but not as intense since her compression fracture patient relayed she received excellent pain relief for almost a year after receiving a L4-5 transforaminal epidural steroid injection from an outside pain anesthesiologist in September of 2024.  Overall she reported multiple months of pain relief.  Her current pain remains to her low back and right buttock.  Her pain is worse with egress sitting from sitting to standing standing too long and normal activities of daily living.  Her pain score today  is an 8/10 on the NRS.  When she participates in simple movements above and normal activities of daily living and chores that will elevate her pain score to a 10/10 on the NRS.  She has chronic kidney disease stage 3 and thus does not take any of the nonsteroidal anti-inflammatory medications.  She is currently not interested in taking any nonnarcotic or controlled substance for pain.  She wants Dr. Odette elena to repeat this same transforaminal epidural steroid injection as her pain has returned and she had excellent pain relief which was in the same area for months.  Patient also completed physical therapy in the past that was ineffective.          Review of EMR shows patient received a transforaminal epidural steroid injection (right L4-5 transforaminal epidural steroid injection per Dr. Gómez Bates neuro interventional neuroradiologist on 09/06/2024.    Review of her MRI lumbar spine from 2024  shows pertinent findings of the following levels in her lumbar spine:  L4-L5 severe central canal stenosis and moderate left foraminal stenosis.  L5-S1 moderate left foraminal stenosis/contact of the exiting left L5 nerve.  L3-L4 moderate central canal stenosis and moderate right foraminal stenosis/contact of the exiting right L3 nerve.   L2-3 moderate central canal stenosis.          HPI with Dr. Sapp on 05/31/2024 included the following:    Jennifer Hudson presents to the clinic with Back Pain (Pt having lower back pain, pain started when back was fractured almost 2 months ago, Taking pain meds,C/O pain level 8. Seeing DR Mishra gave states will be doing sx with him soon gave referral to come see us.)     This is an 89-year-old female who presents to clinic today for her initial consultation as a referral from Dr. Mishra.  She complains of low back pain that started after she fell about 2 months ago.  She suffered an L1 compression fracture and was referred here for kyphoplasty.  She denies any radiation into the lower extremities.    PMH:  Macular degeneration , compression fracture chronic T1,    PSH:  Abdominal surgery, appendectomy, bladder surgery, cataract extraction with lens, lumbar epidural steroid injection 09/2024, oophorectomy, breast reduction bilaterally    Vital signs:   Visit Vitals  /63 (BP Location: Right arm, Patient Position: Sitting)   Pulse 102   Ht 5' (1.524 m)   Wt 78 kg (172 lb)   BMI 33.59 kg/m²      Vitals:    07/17/25 1447   PainSc:   8     Pain Disability Index (PDI): 55       Interventional Pain History  09/06/2024:  Right L4-L5 TFESI (per interventional neuroradiologist Dr. Gómez Bates)    ROS:  Back and right buttock pain    MRI lumbar spine 2024:   FINDINGS:  S shaped lumbar scoliosis     Chronic compression fracture of the superior and inferior endplates of L1 with 75% height loss.     Chronic compression fracture of L5 superior endplate with 25% height loss.     No acute  fracture.     No aggressive marrow signal.     Normal appearance of the distal cord and cauda equina.     No acute or significant paraspinous soft tissue abnormality.     T12-L1: Mild osseous retropulsion associated with the L1 compression fracture mildly indenting the ventral thecal sac.  Mild bilateral facet arthropathy.  No significant stenosis or nerve impingement.     L1-L2: Mild bilateral facet arthropathy with no significant stenosis or nerve impingement.     L2-L3: Moderate disc degeneration, disc bulge, and moderate bilateral facet arthropathy causing moderate central canal stenosis and mild right foraminal stenosis.  The left foramina is patent.     L3-L4: Moderate disc degeneration/Modic type 2 endplate changes, grade 1 degenerative retrolisthesis, right marginal osteophyte, and mild bilateral facet arthropathy causing moderate central canal stenosis, moderate right foraminal stenosis/contact of the exiting right L3 nerve, and mild left foraminal stenosis.     L4-L5: Moderate disc degeneration/Modic type 2 endplate changes, disc bulge, and moderate bilateral facet arthropathy causing severe central canal stenosis and moderate left foraminal stenosis.  The right foramina is patent.     L5-S1: Moderate left disc degeneration, left marginal osteophyte, with moderate left and mild right facet arthropathy causing moderate left foraminal stenosis with contact of the exiting left L5 nerve.  The central canal and right foramina are patent.     Impression:     Chronic compression fractures of L1 and L5 vertebral bodies as detailed above.  No acute fracture.     Multilevel degenerative changes with pertinent findings as follows:     L4-L5 severe central canal stenosis and moderate left foraminal stenosis.     L5-S1 moderate left foraminal stenosis/contact of the exiting left L5 nerve.     L3-L4 moderate central canal stenosis and moderate right foraminal stenosis/contact of the exiting right L3 nerve.     L2-3  moderate central canal stenosis.        Electronically signed by:Patrick Gray  Date:                                            07/16/2024    Objective:        Physical Exam  General: Well developed; under weight A&O x 3; No anxiety/depression; NAD  Mental Status: Oriented to person, palce and time. Displays appropriate mood & affect.  Head: Norm cephalic and atraumatic  Neck:  No cervical paraspinal banding.  Full range of motion with lateral turning and cervical flexion +extension.  Eyes: normal conjunctiva, normal lids, normal pupils  ENT and mouth: normal external ear, nose, and no lesions noted on the lips.  Respiratory: Symmetrical, Unlabored. No dyspnea  CV: normal rhythm and rate. No peripheral edema.   Abdomen: Non-distended    Extremities:  Gen: No cyanosis or tenderness to palpation bilateral upper and lower extremities  Skin: Warm, pink, dry, no rashes, no lesions on the lumbar spine  Strength: 5/5 motor strength bilateral upper and lower extremities  ROM: Full ROM in bilateral knees and ankles without pain or instability.    Neuro:  Gait:  Guarded gait with furniture surfing, normal toe and heel raise. Independent ambulator.  DTR's: 2+ in bilateral patellar,  Sensory: Intact to light touch bilateral  upper and lower extremities    Spine: Normal lordosis.  + scoliosis  L-spine ROM: Full ROM to flexion, extension, bilateral rotation,   Straight Leg Raise:  Positive right  SI Joint:  Tenderness right SI joint negative Sharron's, Gaenslen or thigh thrust noted during exam  Assessment:     Jennifer Hudson is a 89 y.o. female presents today as a follow-up for pain associated with lumbar foraminal stenosis, chronic compression fracture and lumbar radiculopathy.  She is accompanied by a friend and reports she has pain located to her back and right buttock.  This pain has been present but not as intense since her compression fracture patient relayed she received excellent pain relief for almost a year after receiving  a L4-5 transforaminal epidural steroid injection from an outside pain anesthesiologist in September of 2024.  Overall she reported multiple months of pain relief.  Her current pain remains to her low back and right buttock.  Her pain is worse with egress sitting from sitting to standing standing too long and normal activities of daily living.  Her pain score today  is an 8/10 on the NRS.  When she participates in simple movements above and normal activities of daily living and chores that will elevate her pain score to a 10/10 on the NRS.  She has chronic kidney disease stage 3 and thus does not take any of the nonsteroidal anti-inflammatory medications.  She is currently not interested in taking any nonnarcotic or controlled substance for pain.  She wants Dr. Odette elena to repeat this same transforaminal epidural steroid injection as her pain has returned and she had excellent pain relief which was in the same area for months.  Patient also completed physical therapy in the past that was ineffective.        Plan of care:   Request sent for right transforaminal epidural steroid injection bilateral L4 (confirmed levels with Dr. Odette elena as the original epidural steroid that was given by Dr. Gómez Bates specified transforaminal epidural steroid injection L4-L5)      Encounter Diagnoses   Name Primary?    Lumbar radiculopathy Yes    History of compression fracture of spine     Lumbar nerve root impingement     Lumbar disc displacement without myelopathy     Compression fracture of L1 lumbar vertebra     Stenosis, spinal, lumbar          Plan:       Jennifer was seen today for back pain.    Diagnoses and all orders for this visit:    Lumbar radiculopathy    History of compression fracture of spine    Lumbar nerve root impingement    Lumbar disc displacement without myelopathy    Compression fracture of L1 lumbar vertebra  -     Ambulatory referral/consult to Pain Clinic    Stenosis, spinal, lumbar  -     Ambulatory  referral/consult to Pain Clinic           Past Medical History:   Diagnosis Date    Macular degeneration        Past Surgical History:   Procedure Laterality Date    ABDOMINAL SURGERY  1958    ABDOMINOPLASTY  1974    APPENDECTOMY  1943    BLADDER SURGERY      CATARACT EXTRACTION W/  INTRAOCULAR LENS IMPLANT      LAPAROSCOPY      LUMBAR EPIDURAL INJECTION  09/2024    OOPHORECTOMY      REDUCTION OF BOTH BREASTS  1974       Family History   Problem Relation Name Age of Onset    No Known Problems Mother      No Known Problems Father      Hypertension Other         Social History     Socioeconomic History    Marital status:     Number of children: 2   Occupational History    Occupation: Retired   Tobacco Use    Smoking status: Never    Smokeless tobacco: Never   Substance and Sexual Activity    Alcohol use: Not Currently     Comment: past history    Drug use: Never     Social Drivers of Health     Financial Resource Strain: Low Risk  (8/26/2024)    Overall Financial Resource Strain (CARDIA)     Difficulty of Paying Living Expenses: Not hard at all   Food Insecurity: No Food Insecurity (8/26/2024)    Hunger Vital Sign     Worried About Running Out of Food in the Last Year: Never true     Ran Out of Food in the Last Year: Never true   Transportation Needs: No Transportation Needs (12/9/2024)    TRANSPORTATION NEEDS     Transportation : No   Physical Activity: Inactive (8/26/2024)    Exercise Vital Sign     Days of Exercise per Week: 0 days     Minutes of Exercise per Session: 0 min   Stress: No Stress Concern Present (8/26/2024)    [employer] - Occupational Stress Questionnaire     Feeling of Stress : Not at all   Housing Stability: Unknown (12/9/2024)    Housing Stability Vital Sign     Unable to Pay for Housing in the Last Year: No     Homeless in the Last Year: No       Current Medications[1]    Review of patient's allergies indicates:   Allergen Reactions    Codeine                    [1]   Current Outpatient Medications   Medication Sig Dispense Refill    gabapentin (NEURONTIN) 100 MG capsule Take 1 capsule (100 mg total) by mouth 2 (two) times daily as needed (pain). 60 capsule 2    multivit-min/iron/folic/fah331 (HAIR, SKIN AND NAILS ADVANCED ORAL) Take by mouth.      pravastatin (PRAVACHOL) 40 MG tablet Take 1 tablet (40 mg total) by mouth every evening. 90 tablet 1    tolterodine (DETROL LA) 4 MG 24 hr capsule Take 1 capsule (4 mg total) by mouth once daily. 90 capsule 1    traZODone (DESYREL) 50 MG tablet Take 0.5 tablets (25 mg total) by mouth nightly as needed for Insomnia. 15 tablet 6    UNABLE TO FIND Take 10 mg by mouth once daily. Prevagen      vit C/E/cuperic/zinc/lutein (PRESERVISION LUTEIN ORAL) Take by mouth.       No current facility-administered medications for this visit.

## 2025-08-01 DIAGNOSIS — M80.00XD AGE-RELATED OSTEOPOROSIS WITH CURRENT PATHOLOGICAL FRACTURE WITH ROUTINE HEALING: Primary | ICD-10-CM

## 2025-08-04 ENCOUNTER — DOCUMENTATION ONLY (OUTPATIENT)
Dept: INFUSION THERAPY | Facility: HOSPITAL | Age: OVER 89
End: 2025-08-04
Payer: MEDICARE

## 2025-08-12 ENCOUNTER — TELEPHONE (OUTPATIENT)
Facility: CLINIC | Age: OVER 89
End: 2025-08-12
Payer: MEDICARE